# Patient Record
Sex: FEMALE | Race: WHITE | NOT HISPANIC OR LATINO | Employment: OTHER | URBAN - METROPOLITAN AREA
[De-identification: names, ages, dates, MRNs, and addresses within clinical notes are randomized per-mention and may not be internally consistent; named-entity substitution may affect disease eponyms.]

---

## 2020-10-13 ENCOUNTER — OFFICE VISIT (OUTPATIENT)
Dept: ENDOCRINOLOGY | Facility: CLINIC | Age: 60
End: 2020-10-13

## 2020-10-13 ENCOUNTER — LAB (OUTPATIENT)
Dept: LAB | Facility: HOSPITAL | Age: 60
End: 2020-10-13

## 2020-10-13 VITALS
HEART RATE: 61 BPM | SYSTOLIC BLOOD PRESSURE: 102 MMHG | WEIGHT: 125.8 LBS | OXYGEN SATURATION: 99 % | DIASTOLIC BLOOD PRESSURE: 62 MMHG

## 2020-10-13 DIAGNOSIS — R53.82 CHRONIC FATIGUE: ICD-10-CM

## 2020-10-13 DIAGNOSIS — E03.9 ACQUIRED HYPOTHYROIDISM: Primary | ICD-10-CM

## 2020-10-13 DIAGNOSIS — K90.9 INTESTINAL MALABSORPTION, UNSPECIFIED TYPE: ICD-10-CM

## 2020-10-13 LAB
BASOPHILS # BLD AUTO: 0.03 10*3/MM3 (ref 0–0.2)
BASOPHILS NFR BLD AUTO: 0.7 % (ref 0–1.5)
DEPRECATED RDW RBC AUTO: 40.4 FL (ref 37–54)
EOSINOPHIL # BLD AUTO: 0.09 10*3/MM3 (ref 0–0.4)
EOSINOPHIL NFR BLD AUTO: 2 % (ref 0.3–6.2)
ERYTHROCYTE [DISTWIDTH] IN BLOOD BY AUTOMATED COUNT: 11.8 % (ref 12.3–15.4)
HCT VFR BLD AUTO: 37.1 % (ref 34–46.6)
HGB BLD-MCNC: 12.7 G/DL (ref 12–15.9)
LYMPHOCYTES # BLD AUTO: 0.89 10*3/MM3 (ref 0.7–3.1)
LYMPHOCYTES NFR BLD AUTO: 20.1 % (ref 19.6–45.3)
MCH RBC QN AUTO: 32.6 PG (ref 26.6–33)
MCHC RBC AUTO-ENTMCNC: 34.2 G/DL (ref 31.5–35.7)
MCV RBC AUTO: 95.1 FL (ref 79–97)
MONOCYTES # BLD AUTO: 0.26 10*3/MM3 (ref 0.1–0.9)
MONOCYTES NFR BLD AUTO: 5.9 % (ref 5–12)
NEUTROPHILS NFR BLD AUTO: 3.16 10*3/MM3 (ref 1.7–7)
NEUTROPHILS NFR BLD AUTO: 71.3 % (ref 42.7–76)
PLATELET # BLD AUTO: 139 10*3/MM3 (ref 140–450)
PMV BLD AUTO: 11.7 FL (ref 6–12)
RBC # BLD AUTO: 3.9 10*6/MM3 (ref 3.77–5.28)
WBC # BLD AUTO: 4.43 10*3/MM3 (ref 3.4–10.8)

## 2020-10-13 PROCEDURE — 83516 IMMUNOASSAY NONANTIBODY: CPT | Performed by: PHYSICIAN ASSISTANT

## 2020-10-13 PROCEDURE — 99243 OFF/OP CNSLTJ NEW/EST LOW 30: CPT | Performed by: PHYSICIAN ASSISTANT

## 2020-10-13 PROCEDURE — 86376 MICROSOMAL ANTIBODY EACH: CPT | Performed by: PHYSICIAN ASSISTANT

## 2020-10-13 PROCEDURE — 82306 VITAMIN D 25 HYDROXY: CPT | Performed by: PHYSICIAN ASSISTANT

## 2020-10-13 PROCEDURE — 86255 FLUORESCENT ANTIBODY SCREEN: CPT | Performed by: PHYSICIAN ASSISTANT

## 2020-10-13 PROCEDURE — 84443 ASSAY THYROID STIM HORMONE: CPT | Performed by: PHYSICIAN ASSISTANT

## 2020-10-13 PROCEDURE — 85025 COMPLETE CBC W/AUTO DIFF WBC: CPT | Performed by: PHYSICIAN ASSISTANT

## 2020-10-13 PROCEDURE — 82784 ASSAY IGA/IGD/IGG/IGM EACH: CPT | Performed by: PHYSICIAN ASSISTANT

## 2020-10-13 PROCEDURE — 80053 COMPREHEN METABOLIC PANEL: CPT | Performed by: PHYSICIAN ASSISTANT

## 2020-10-13 PROCEDURE — 84439 ASSAY OF FREE THYROXINE: CPT | Performed by: PHYSICIAN ASSISTANT

## 2020-10-13 PROCEDURE — 82728 ASSAY OF FERRITIN: CPT | Performed by: PHYSICIAN ASSISTANT

## 2020-10-13 RX ORDER — LABETALOL 300 MG/1
TABLET, FILM COATED ORAL
COMMUNITY
End: 2022-08-31 | Stop reason: DRUGHIGH

## 2020-10-13 RX ORDER — ROSUVASTATIN CALCIUM 20 MG/1
TABLET, COATED ORAL
COMMUNITY
End: 2021-09-08

## 2020-10-13 RX ORDER — ASPIRIN 81 MG/1
TABLET ORAL
COMMUNITY
Start: 2019-01-16

## 2020-10-13 RX ORDER — ASPIRIN 81 MG/1
TABLET ORAL
COMMUNITY
End: 2020-10-13 | Stop reason: SDUPTHER

## 2020-10-13 RX ORDER — HERBAL DRUGS
CAPSULE ORAL
COMMUNITY
End: 2021-06-04

## 2020-10-13 RX ORDER — LEVOTHYROXINE SODIUM 0.2 MG/1
TABLET ORAL
COMMUNITY
End: 2020-10-19

## 2020-10-13 RX ORDER — LEVOTHYROXINE SODIUM 137 MCG
TABLET ORAL
COMMUNITY
Start: 2020-09-27 | End: 2020-10-19

## 2020-10-13 RX ORDER — FUROSEMIDE 40 MG/1
TABLET ORAL
COMMUNITY
Start: 2020-07-17 | End: 2022-05-10 | Stop reason: SDUPTHER

## 2020-10-13 RX ORDER — CLOPIDOGREL BISULFATE 75 MG/1
TABLET ORAL
COMMUNITY
Start: 2020-07-17 | End: 2022-05-10 | Stop reason: SDUPTHER

## 2020-10-13 RX ORDER — POTASSIUM CHLORIDE 1.5 G/1.77G
20 POWDER, FOR SOLUTION ORAL AS NEEDED
COMMUNITY

## 2020-10-13 RX ORDER — LISINOPRIL 10 MG/1
TABLET ORAL
COMMUNITY
End: 2022-08-31

## 2020-10-13 RX ORDER — ESTRADIOL 1 MG/1
1 TABLET ORAL DAILY
COMMUNITY
Start: 2020-09-27 | End: 2021-10-13 | Stop reason: SDUPTHER

## 2020-10-13 NOTE — PROGRESS NOTES
Chief Complaint:   Radha White is a 60 y.o. female who is being seen today for  Hypothyroidism . Referred by Lucian Ridley MD     HPI     59 yo female with CAD s/p remote stents followed by Dr. Demetri Yang, hypertension, hyperlipidemia, hx of nephrectomy 1965, complete hysterectomy 1996.    She c/o severe fatigue and hair loss over the last year. Has gained 15 pounds in the last year after weight was stable for a very long time. Diet has not changed. Activity has not changed. Not sleeping well, which is not normal for her.   She c/o hot flashes. On estradiol.   She feels very bloated after eating for about a year, but not with any specific food. She does not eat much dairy. She denies diarrhea, usually has constipation. Denies blood in stool.   She has chronic joint pain.   No skin changes. Has a chronic dry patch on right elbow otherwise no dry skin.   Had liver failure in 2007- work up was normal and it was attributed to naproxen.   Diagnosed: 1987 on labs after reporting hair thinning   Medication: synthroid brand total 337 (200 plus 137) mcg daily - she takes in the morning on empty stomach with water and waits several hours to eat. Not on any iron, calcium, biotin supplements. Not missing doses.    Previous medication: levothyroxine (worked for a long time, but eventually labs stopped responding to higher doses of generic)  Hx of radiation to head/neck: no  Family hx of thyroid cancer: no  Daughter has Hashimoto's and was told to avoid gluten. She has to been changed to Tirosint due to absorption issues.     The following portions of the patient's history were reviewed and updated as appropriate: allergies, current medications, past family history, past medical history, past social history, past surgical history and problem list.    Review of Systems  Review of Systems   Constitutional: Positive for fatigue and unexpected weight change (weight gain).   Gastrointestinal: Positive for abdominal  distention (after eating) and constipation.   Endocrine:        Hot flashes   Musculoskeletal: Positive for arthralgias.   Psychiatric/Behavioral: Positive for sleep disturbance.   All other systems reviewed and are negative.       Current medications:  Current Outpatient Medications   Medication Sig Dispense Refill   • aspirin (aspirin) 81 MG EC tablet Take 1 capsule twice a day by oral route for 30 days.     • clopidogrel (PLAVIX) 75 MG tablet clopidogrel 75 mg tablet     • estradiol (ESTRACE) 1 MG tablet Take 1 mg by mouth Daily.     • furosemide (LASIX) 40 MG tablet furosemide 40 mg tablet     • labetalol (NORMODYNE) 300 MG tablet labetalol 300 mg tablet   Take 1 tablet every day by oral route.     • levothyroxine (Synthroid) 200 MCG tablet Synthroid 200 mcg tablet     • lisinopril (PRINIVIL,ZESTRIL) 10 MG tablet lisinopril 10 mg tablet     • Misc Natural Products (Detox) capsule Take  by mouth.     • Omeprazole 20 MG Tablet Delayed Release Dispersible omeprazole 20 mg capsule,delayed release     • potassium chloride (Klor-Con) 20 MEQ packet Take 20 mEq by mouth As Needed.     • Probiotic Product (Pro-biotic Blend) capsule Take  by mouth.     • rosuvastatin (CRESTOR) 20 MG tablet rosuvastatin 20 mg tablet   Take 1 tablet every day by oral route.     • Synthroid 137 MCG tablet TAKE 1 TABLET BY MOUTH ONCE DAILY ALONG WITH 200 MCG TABLET FOR TOTAL DAILY DOSE  MCG     • Ubiquinol 100 MG capsule coenzyme Q10 (ubiquinol) 100 mg capsule   Take 2 capsules by oral route for 30 days.       No current facility-administered medications for this visit.        Physical Exam   Vitals:    10/13/20 1009   BP: 102/62   Pulse: 61   SpO2: 99%   There is no height or weight on file to calculate BMI.  Physical Exam  Constitutional:       General: She is not in acute distress.     Appearance: She is well-developed. She is not diaphoretic.   HENT:      Head: Normocephalic.      Right Ear: External ear normal.      Left Ear:  External ear normal.      Mouth/Throat:      Pharynx: No oropharyngeal exudate.   Eyes:      General: Lids are normal.         Right eye: No discharge.         Left eye: No discharge.      Conjunctiva/sclera: Conjunctivae normal.      Pupils:      Right eye: Pupil is reactive.      Left eye: Pupil is reactive.   Neck:      Thyroid: No thyroid mass or thyromegaly.      Vascular: No JVD.      Trachea: No tracheal deviation.   Cardiovascular:      Rate and Rhythm: Normal rate and regular rhythm.      Heart sounds: Normal heart sounds. No murmur.   Pulmonary:      Effort: Pulmonary effort is normal. No respiratory distress.      Breath sounds: Normal breath sounds. No wheezing.   Abdominal:      General: Bowel sounds are normal.      Palpations: Abdomen is soft.      Tenderness: There is no abdominal tenderness.   Musculoskeletal:         General: No tenderness.   Lymphadenopathy:      Cervical: No cervical adenopathy.   Skin:     General: Skin is warm and dry.      Findings: No erythema or rash.   Neurological:      Mental Status: She is alert and oriented to person, place, and time.   Psychiatric:         Speech: Speech normal.         Behavior: Behavior normal.         Thought Content: Thought content normal.         Labs and Imaging   No results found for: TSH, B8HQYCP, L6YBJBS, THYROIDAB     Labs reviewed from 7/17/2020- TSH 6.48, Total T3 73.3    Assessment / Plan     Diagnoses and all orders for this visit:    1. Acquired hypothyroidism (Primary)  -     TSH  -     T4, Free  -     Thyroid Peroxidase Antibody    2. Intestinal malabsorption, unspecified type  -     Celiac Comprehensive Panel  -     Comprehensive Metabolic Panel  -     CBC & Differential    3. Chronic fatigue  -     Vitamin D 25 Hydroxy  -     Ferritin  -     Comprehensive Metabolic Panel  -     CBC & Differential        Problem List Items Addressed This Visit     None      Visit Diagnoses     Acquired hypothyroidism    -  Primary    Relevant  Medications    labetalol (NORMODYNE) 300 MG tablet    levothyroxine (Synthroid) 200 MCG tablet    Synthroid 137 MCG tablet    Other Relevant Orders    TSH    T4, Free    Thyroid Peroxidase Antibody    Intestinal malabsorption, unspecified type        Relevant Orders    Celiac Comprehensive Panel    Comprehensive Metabolic Panel    CBC & Differential    Chronic fatigue        Relevant Orders    Vitamin D 25 Hydroxy    Ferritin    Comprehensive Metabolic Panel    CBC & Differential        Diagnosis was discussed and reviewed with the patient including the advantages of drug therapy.        1. Patient appears clinically hypothyroid. TSH >6 on very large doses of Synthroid brand 337 mcg total per day. Patient is taking the medication correctly and is not missing doses. Repeat TFTs. Suspect some absorption issues. Plan to change to Tirosint after labs. Check celiac panel.       We have discussed in details the nature of the thyroid disease, thyroid hormone action, optimal TSH goals of 0.5-3.5, method of administration of levothyroxine/synthroid/tirosint and medication interactions.  I recommended taking the medication on an empty stomach in the morning or at bedtime, at least 30 minutes prior to intake of food or hot drinks and 4 hours apart from calcium or iron supplements. If a dose is missed patient can take two the following day.   2. Check vit d, ferritin with her fatigue  3. Patient will return to our office in 2 months.   4. The risks and benefits of my recommendations, as well as other treatment options were discussed with the patient today. Questions were answered.       30 min of 45 face-to-face visit time spent for coordination of care and counselling regarding identified problems as outlined in the objective, assessment and discussion portions of the documentation.    Urbano Helton PA-C

## 2020-10-14 LAB
25(OH)D3 SERPL-MCNC: 44.6 NG/ML (ref 30–100)
ALBUMIN SERPL-MCNC: 4.4 G/DL (ref 3.5–5.2)
ALBUMIN/GLOB SERPL: 1.3 G/DL
ALP SERPL-CCNC: 210 U/L (ref 39–117)
ALT SERPL W P-5'-P-CCNC: 74 U/L (ref 1–33)
ANION GAP SERPL CALCULATED.3IONS-SCNC: 11 MMOL/L (ref 5–15)
AST SERPL-CCNC: 114 U/L (ref 1–32)
BILIRUB SERPL-MCNC: 0.7 MG/DL (ref 0–1.2)
BUN SERPL-MCNC: 16 MG/DL (ref 6–20)
BUN/CREAT SERPL: 20.5 (ref 7–25)
CALCIUM SPEC-SCNC: 9.7 MG/DL (ref 8.6–10.5)
CHLORIDE SERPL-SCNC: 99 MMOL/L (ref 98–107)
CO2 SERPL-SCNC: 25 MMOL/L (ref 22–29)
CREAT SERPL-MCNC: 0.78 MG/DL (ref 0.57–1)
FERRITIN SERPL-MCNC: 284 NG/ML (ref 13–150)
GFR SERPL CREATININE-BSD FRML MDRD: 76 ML/MIN/1.73
GLOBULIN UR ELPH-MCNC: 3.5 GM/DL
GLUCOSE SERPL-MCNC: 113 MG/DL (ref 65–99)
POTASSIUM SERPL-SCNC: 4.3 MMOL/L (ref 3.5–5.2)
PROT SERPL-MCNC: 7.9 G/DL (ref 6–8.5)
SODIUM SERPL-SCNC: 135 MMOL/L (ref 136–145)
T4 FREE SERPL-MCNC: 1.91 NG/DL (ref 0.93–1.7)
TSH SERPL DL<=0.05 MIU/L-ACNC: 1.06 UIU/ML (ref 0.27–4.2)

## 2020-10-15 LAB
ENDOMYSIUM IGA SER QL: NEGATIVE
GLIADIN PEPTIDE IGA SER-ACNC: 3 UNITS (ref 0–19)
GLIADIN PEPTIDE IGG SER-ACNC: 3 UNITS (ref 0–19)
IGA SERPL-MCNC: 90 MG/DL (ref 87–352)
THYROPEROXIDASE AB SERPL-ACNC: 19 IU/ML (ref 0–34)
TTG IGA SER-ACNC: <2 U/ML (ref 0–3)
TTG IGG SER-ACNC: <2 U/ML (ref 0–5)

## 2020-10-19 ENCOUNTER — TELEPHONE (OUTPATIENT)
Dept: ENDOCRINOLOGY | Facility: CLINIC | Age: 60
End: 2020-10-19

## 2020-10-19 DIAGNOSIS — E03.9 ACQUIRED HYPOTHYROIDISM: Primary | ICD-10-CM

## 2020-10-19 RX ORDER — LEVOTHYROXINE SODIUM 137 UG/1
137 CAPSULE ORAL DAILY
Qty: 90 CAPSULE | Refills: 1 | Status: SHIPPED | OUTPATIENT
Start: 2020-10-19 | End: 2021-01-07 | Stop reason: DRUGHIGH

## 2020-10-19 RX ORDER — LEVOTHYROXINE SODIUM 200 UG/1
200 CAPSULE ORAL DAILY
Qty: 90 CAPSULE | Refills: 1 | Status: SHIPPED | OUTPATIENT
Start: 2020-10-19 | End: 2021-04-08 | Stop reason: SDUPTHER

## 2020-10-19 NOTE — TELEPHONE ENCOUNTER
PT was hoping PCP could discuss her blood work results that she had done 10/13/2020. She ask that someone reach out to her

## 2020-10-19 NOTE — TELEPHONE ENCOUNTER
Called and spoke with patient informed of lab results. Will document on labs. Other than that she does like the tirinosint that was prescribed she does feel better overall and would like prescription sent to the pharmacy.

## 2020-10-19 NOTE — TELEPHONE ENCOUNTER
Called and spoke to patient, informed of message. She verbalized understanding and had no further questions.

## 2021-01-05 ENCOUNTER — TELEPHONE (OUTPATIENT)
Dept: ENDOCRINOLOGY | Facility: CLINIC | Age: 61
End: 2021-01-05

## 2021-01-05 DIAGNOSIS — E03.9 ACQUIRED HYPOTHYROIDISM: Primary | ICD-10-CM

## 2021-01-05 NOTE — TELEPHONE ENCOUNTER
PT was informed that labs orders were placed and that liver function and thyroid would continue to be followed.

## 2021-01-05 NOTE — TELEPHONE ENCOUNTER
Patient was started on new medication at her last appointment and she wanted to come in and have labs drawn before her February appointment. Please put orders in her chart. Also, she wanted to let Urbano know that she has seen a liver specialist since her last appt with Urbano and was told her liver issues could be happening due to her thyroid issues.

## 2021-01-06 ENCOUNTER — LAB (OUTPATIENT)
Dept: ENDOCRINOLOGY | Facility: CLINIC | Age: 61
End: 2021-01-06

## 2021-01-06 ENCOUNTER — LAB (OUTPATIENT)
Dept: LAB | Facility: HOSPITAL | Age: 61
End: 2021-01-06

## 2021-01-06 DIAGNOSIS — E03.9 ACQUIRED HYPOTHYROIDISM: ICD-10-CM

## 2021-01-06 PROCEDURE — 84439 ASSAY OF FREE THYROXINE: CPT | Performed by: PHYSICIAN ASSISTANT

## 2021-01-06 PROCEDURE — 84443 ASSAY THYROID STIM HORMONE: CPT | Performed by: PHYSICIAN ASSISTANT

## 2021-01-07 ENCOUNTER — TELEPHONE (OUTPATIENT)
Dept: ENDOCRINOLOGY | Facility: CLINIC | Age: 61
End: 2021-01-07

## 2021-01-07 DIAGNOSIS — E03.9 ACQUIRED HYPOTHYROIDISM: Primary | ICD-10-CM

## 2021-01-07 LAB
T4 FREE SERPL-MCNC: 2.83 NG/DL (ref 0.93–1.7)
TSH SERPL DL<=0.05 MIU/L-ACNC: <0.005 UIU/ML (ref 0.27–4.2)

## 2021-01-07 RX ORDER — LEVOTHYROXINE SODIUM 75 UG/1
75 CAPSULE ORAL DAILY
Qty: 90 CAPSULE | Refills: 0 | Status: SHIPPED | OUTPATIENT
Start: 2021-01-07 | End: 2021-04-08 | Stop reason: DRUGHIGH

## 2021-01-07 NOTE — TELEPHONE ENCOUNTER
PT CALLED WANTING TO SPEAK W/ SOMEONE IN REGARDS TO HER MOST RECENT LABS. SHE WAS UPSET THAT WE HAD SENT IN A CHANGE IN HER MEDICATION BEFORE WE SPOKE W/ HER. PLEASE REACH OUT TO HER AT EARLIEST CONVENIENCE. THANK YOU

## 2021-02-01 ENCOUNTER — OFFICE VISIT (OUTPATIENT)
Dept: ENDOCRINOLOGY | Facility: CLINIC | Age: 61
End: 2021-02-01

## 2021-02-01 DIAGNOSIS — E03.9 ACQUIRED HYPOTHYROIDISM: Primary | Chronic | ICD-10-CM

## 2021-02-01 PROCEDURE — 99441 PR PHYS/QHP TELEPHONE EVALUATION 5-10 MIN: CPT | Performed by: PHYSICIAN ASSISTANT

## 2021-02-01 RX ORDER — MULTIPLE VITAMINS W/ MINERALS TAB 9MG-400MCG
1 TAB ORAL DAILY
COMMUNITY

## 2021-02-01 NOTE — PROGRESS NOTES
"Phone visit conducted to comply with patient safety concerns in accordance with CDC recommendations for the COVID-19 pandemic.     Chief Complaint:   Radha White is a 60 y.o. female who is being seen today for  Hypothyroidism .    HPI     61 yo female with CAD s/p remote stents followed by Dr. Demetri Yang, hypertension, hyperlipidemia, hx of nephrectomy 1965, complete hysterectomy 1996, following up on hypothyroidism today.   Last visit we change Synthroid 337 to Tirostint 275 due to concern for malabsorption with such a high dose. Repeat labs 1/5 showed undetectable TSH with high FT4. We decreased Tirosint to 275 mcg daily. Today pt reports to be feeling much better on this dose. She is sleeping better, has more energy, and has lost some weight. Does feel like she \"retains water\" more and has to take her lasix about once a week whereas before she took it about once a month.     Had liver failure in 2007- work up was normal and it was attributed to naproxen. Since last visit she has seen a liver specialist at  who was concerned liver fibrosis may be due to such high doses of BP meds and synthroid.     Diagnosed: 1987 on labs after reporting hair thinning   Medication: tirostin 275 mcg daily - she takes in the morning on empty stomach with water and waits several hours to eat. Not on any iron, calcium, biotin supplements. Not missing doses.    Previous medication: levothyroxine (worked for a long time, but eventually labs stopped responding to higher doses of generic), synthroid brand  Hx of radiation to head/neck: no  Family hx of thyroid cancer: no  Daughter has Hashimoto's and was told to avoid gluten. She has to been changed to Tirosint due to absorption issues.     The following portions of the patient's history were reviewed and updated by me as appropriate: allergies, current medications, past family history, past social history, past surgical history and problem list.      Review of Systems  Review of " Systems   Constitutional: Positive for fatigue. Diaphoresis: improving.   Cardiovascular: Positive for leg swelling.   Gastrointestinal: Abdominal distention: after eating.   Endocrine:        Hot flashes   Psychiatric/Behavioral: Positive for sleep disturbance (improving).   All other systems reviewed and are negative.       Current medications:  Current Outpatient Medications   Medication Sig Dispense Refill   • aspirin (aspirin) 81 MG EC tablet Take 1 capsule twice a day by oral route for 30 days.     • clopidogrel (PLAVIX) 75 MG tablet clopidogrel 75 mg tablet     • estradiol (ESTRACE) 1 MG tablet Take 1 mg by mouth Daily.     • furosemide (LASIX) 40 MG tablet furosemide 40 mg tablet     • labetalol (NORMODYNE) 300 MG tablet labetalol 300 mg tablet   Take 1 tablet every day by oral route.     • lisinopril (PRINIVIL,ZESTRIL) 10 MG tablet lisinopril 10 mg tablet     • Misc Natural Products (Detox) capsule Take  by mouth.     • multivitamin with minerals tablet tablet Take 1 tablet by mouth Daily.     • Omeprazole 20 MG Tablet Delayed Release Dispersible omeprazole 20 mg capsule,delayed release     • potassium chloride (Klor-Con) 20 MEQ packet Take 20 mEq by mouth As Needed.     • Probiotic Product (Pro-biotic Blend) capsule Take  by mouth.     • rosuvastatin (CRESTOR) 20 MG tablet rosuvastatin 20 mg tablet   Take 1 tablet every day by oral route.     • Tirosint 200 MCG capsule Take 200 mcg by mouth Daily. 90 capsule 1   • Tirosint 75 MCG capsule Take 1 capsule by mouth Daily. 90 capsule 0   • Ubiquinol 100 MG capsule coenzyme Q10 (ubiquinol) 100 mg capsule   Take 2 capsules by oral route for 30 days.       No current facility-administered medications for this visit.        Physical Exam   There were no vitals filed for this visit.There is no height or weight on file to calculate BMI.  Physical Exam  Constitutional:       General: She is not in acute distress.  Neurological:      Mental Status: She is alert and  oriented to person, place, and time.   Psychiatric:         Mood and Affect: Mood normal.         Thought Content: Thought content normal.         Judgment: Judgment normal.     Limited exam due video/phone visit      Labs and Imaging   Lab Results   Component Value Date    TSH <0.005 (L) 01/06/2021    THYROIDAB 19 10/13/2020    1.5.2020 - free t4 2.83      Assessment / Plan     Diagnoses and all orders for this visit:    1. Acquired hypothyroidism (Primary)  -     TSH; Future  -     T4, Free; Future        Problem List Items Addressed This Visit        Other    Acquired hypothyroidism - Primary (Chronic)    Relevant Orders    TSH    T4, Free        Diagnosis was discussed and reviewed with the patient including the advantages of drug therapy.        1. Patient appears clinically euthyroid. Overall much better on Tirosint and lower dose. Dose was decreased about 4 weeks ago. Will wait another 4 weeks before patient repeats labs. Suspect dose will need to be decreased further.  2. Patient will return to our office in 3 months.   3. The risks and benefits of my recommendations, as well as other treatment options were discussed with the patient today. Questions were answered.    Total time of discussion was 10 minutes.      Urbano Helton PA-C

## 2021-03-03 ENCOUNTER — LAB (OUTPATIENT)
Dept: LAB | Facility: HOSPITAL | Age: 61
End: 2021-03-03

## 2021-03-03 ENCOUNTER — TELEPHONE (OUTPATIENT)
Dept: ENDOCRINOLOGY | Facility: CLINIC | Age: 61
End: 2021-03-03

## 2021-03-03 ENCOUNTER — LAB (OUTPATIENT)
Dept: ENDOCRINOLOGY | Facility: CLINIC | Age: 61
End: 2021-03-03

## 2021-03-03 DIAGNOSIS — E03.9 ACQUIRED HYPOTHYROIDISM: Chronic | ICD-10-CM

## 2021-03-03 LAB
T4 FREE SERPL-MCNC: 2.65 NG/DL (ref 0.93–1.7)
TSH SERPL DL<=0.05 MIU/L-ACNC: 0.01 UIU/ML (ref 0.27–4.2)

## 2021-03-03 PROCEDURE — 84439 ASSAY OF FREE THYROXINE: CPT

## 2021-03-03 PROCEDURE — 84443 ASSAY THYROID STIM HORMONE: CPT

## 2021-03-03 NOTE — TELEPHONE ENCOUNTER
Pt came in for labs today and she wanted to talk to you before the new rx is called in     pts number [phone]

## 2021-03-03 NOTE — TELEPHONE ENCOUNTER
PT wanted to make you are aware, before you made any changes based on the lab work she had done today, that she has gained 8 lbs in 4 weeks since you changed it last time. She states that she is a healthy eater and works out hard everyday therefor there is no reason for her to gain weight other than this thyroid issue.

## 2021-03-09 ENCOUNTER — TELEPHONE (OUTPATIENT)
Dept: ENDOCRINOLOGY | Facility: CLINIC | Age: 61
End: 2021-03-09

## 2021-03-29 ENCOUNTER — TELEPHONE (OUTPATIENT)
Dept: ENDOCRINOLOGY | Facility: CLINIC | Age: 61
End: 2021-03-29

## 2021-03-29 NOTE — TELEPHONE ENCOUNTER
PATIENT IS REQUESTING A RETURN CALL FROM [facility] TO DISCUSS RECENT METABOLISM ISSUES. SHE REQUESTED TO BE SEEN SOONER THAN HER 5/10 APPT BUT NO OPENINGS AVAILABLE AT THIS TIME.    CALL BACK [phone]

## 2021-04-07 ENCOUNTER — LAB (OUTPATIENT)
Dept: LAB | Facility: HOSPITAL | Age: 61
End: 2021-04-07

## 2021-04-07 ENCOUNTER — OFFICE VISIT (OUTPATIENT)
Dept: ENDOCRINOLOGY | Facility: CLINIC | Age: 61
End: 2021-04-07

## 2021-04-07 VITALS
WEIGHT: 126.2 LBS | DIASTOLIC BLOOD PRESSURE: 90 MMHG | HEART RATE: 62 BPM | SYSTOLIC BLOOD PRESSURE: 142 MMHG | OXYGEN SATURATION: 99 % | HEIGHT: 63 IN | BODY MASS INDEX: 22.36 KG/M2 | TEMPERATURE: 98.4 F

## 2021-04-07 DIAGNOSIS — E03.9 ACQUIRED HYPOTHYROIDISM: Primary | Chronic | ICD-10-CM

## 2021-04-07 DIAGNOSIS — R63.5 WEIGHT GAIN: ICD-10-CM

## 2021-04-07 LAB
T4 FREE SERPL-MCNC: 1.9 NG/DL (ref 0.93–1.7)
TSH SERPL DL<=0.05 MIU/L-ACNC: 0.01 UIU/ML (ref 0.27–4.2)

## 2021-04-07 PROCEDURE — 99214 OFFICE O/P EST MOD 30 MIN: CPT | Performed by: PHYSICIAN ASSISTANT

## 2021-04-07 PROCEDURE — 84439 ASSAY OF FREE THYROXINE: CPT | Performed by: PHYSICIAN ASSISTANT

## 2021-04-07 PROCEDURE — 84443 ASSAY THYROID STIM HORMONE: CPT | Performed by: PHYSICIAN ASSISTANT

## 2021-04-07 RX ORDER — DEXAMETHASONE 1 MG
TABLET ORAL
Qty: 1 TABLET | Refills: 0 | Status: SHIPPED | OUTPATIENT
Start: 2021-04-07 | End: 2021-09-28

## 2021-04-07 NOTE — PROGRESS NOTES
Chief Complaint:   Radha White is a 60 y.o. female who is being seen today for  Hypothyroidism .    HPI     59 yo female with CAD s/p remote stents followed by Dr. Demetri Yang, hypertension, hyperlipidemia, hx of nephrectomy 1965, complete hysterectomy 1996, following up on hypothyroidism today.   10/2020- we changed Synthroid 337 to Tirostint 275 due to concern for malabsorption with such a high dose.    1/2020- repeat labs showed undetectable TSH with high FT4. We decreased Tirosint to 275 mcg daily.   3/3/2020 - TSH still low at 0.008 but no longer undetectable. Pt c/o new weight gain at that point so we planned to repeat labs in another month instead of decreasing dose just yet.   Today pt c/o weight gain though her weight in the same per out scale compared to weight in October. She does clarify and say weight gain is chronic over several years. She exercises regularly and eats well and is frustrated with inability to lose weight.   Also think sweating at night has increased over the last few months.     Had liver failure in 2007- work up was normal and it was attributed to naproxen. Since last visit she has seen a liver specialist at  who was concerned liver fibrosis may be due to such high doses of BP meds and synthroid.     Diagnosed: 1987 on labs after reporting hair thinning   Medication: tirostin 275 mcg daily - she takes in the morning on empty stomach with water and waits several hours to eat. Not on any iron, calcium, biotin supplements. Not missing doses.    Previous medication: levothyroxine (worked for a long time, but eventually labs stopped responding to higher doses of generic), synthroid brand  Hx of radiation to head/neck: no  Family hx of thyroid cancer: no  Daughter has Hashimoto's and was told to avoid gluten. She has to been changed to Tirosint due to absorption issues.     The following portions of the patient's history were reviewed and updated by me as appropriate: allergies,  current medications, past family history, past social history, past surgical history and problem list.        Review of Systems  Review of Systems   Constitutional: Positive for fatigue.        Unable to lose weight   Gastrointestinal: Abdominal distention: after eating.   Endocrine:        Hot flashes   Psychiatric/Behavioral: Positive for sleep disturbance (improving).   All other systems reviewed and are negative.       Current medications:  Current Outpatient Medications   Medication Sig Dispense Refill   • aspirin (aspirin) 81 MG EC tablet Take 1 capsule twice a day by oral route for 30 days.     • clopidogrel (PLAVIX) 75 MG tablet clopidogrel 75 mg tablet     • estradiol (ESTRACE) 1 MG tablet Take 1 mg by mouth Daily.     • furosemide (LASIX) 40 MG tablet furosemide 40 mg tablet     • labetalol (NORMODYNE) 300 MG tablet labetalol 300 mg tablet   Take 1 tablet every day by oral route.     • lisinopril (PRINIVIL,ZESTRIL) 10 MG tablet lisinopril 10 mg tablet     • Misc Natural Products (Detox) capsule Take  by mouth.     • multivitamin with minerals tablet tablet Take 1 tablet by mouth Daily.     • Omeprazole 20 MG Tablet Delayed Release Dispersible omeprazole 20 mg capsule,delayed release     • potassium chloride (Klor-Con) 20 MEQ packet Take 20 mEq by mouth As Needed.     • Probiotic Product (Pro-biotic Blend) capsule Take  by mouth.     • rosuvastatin (CRESTOR) 20 MG tablet rosuvastatin 20 mg tablet   Take 1 tablet every day by oral route.     • Tirosint 200 MCG capsule Take 200 mcg by mouth Daily. 90 capsule 1   • Tirosint 75 MCG capsule Take 1 capsule by mouth Daily. 90 capsule 0   • Ubiquinol 100 MG capsule coenzyme Q10 (ubiquinol) 100 mg capsule   Take 2 capsules by oral route for 30 days.     • dexamethasone (DECADRON) 1 MG tablet Take 1mg x 1 at 11 pm the night before cortisol checked. Cortisol should be checked at 8AM the following day 1 tablet 0     No current facility-administered medications for this  visit.       Physical Exam   Vitals:    04/07/21 1157   BP: 142/90   Pulse: 62   Temp: 98.4 °F (36.9 °C)   SpO2: 99%   Body mass index is 22.36 kg/m².  Physical Exam  Constitutional:       General: She is not in acute distress.  Neurological:      Mental Status: She is alert and oriented to person, place, and time.   Psychiatric:         Mood and Affect: Mood normal.         Thought Content: Thought content normal.         Judgment: Judgment normal.           Labs and Imaging   Lab Results   Component Value Date    TSH 0.008 (L) 03/03/2021    THYROIDAB 19 10/13/2020    1.5.2020 - free t4 2.83      Assessment / Plan     Diagnoses and all orders for this visit:    1. Acquired hypothyroidism (Primary)  -     TSH  -     T4, Free    2. Weight gain  -     Cortisol Dexamethasone Reflex; Future  -     dexamethasone (DECADRON) 1 MG tablet; Take 1mg x 1 at 11 pm the night before cortisol checked. Cortisol should be checked at 8AM the following day  Dispense: 1 tablet; Refill: 0        Problem List Items Addressed This Visit        Other    Acquired hypothyroidism - Primary (Chronic)    Relevant Medications    dexamethasone (DECADRON) 1 MG tablet    Other Relevant Orders    TSH    T4, Free      Other Visit Diagnoses     Weight gain        Relevant Medications    dexamethasone (DECADRON) 1 MG tablet    Other Relevant Orders    Cortisol Dexamethasone Reflex        Diagnosis was discussed and reviewed with the patient including the advantages of drug therapy.        1. Patient appears clinically hyperthyroid. Repeat labs with plan to decrease dose. Discussed weight gain unlikely related to thyroid- she has not lost any weight when TSH was around 1.0 or when it was undetectable. Low suspicion for Cushing's but will screen.  2. Patient will return to our office in 2 months.   3. The risks and benefits of my recommendations, as well as other treatment options were discussed with the patient today. Questions were answered.    Urbano  MONA Helton

## 2021-04-08 DIAGNOSIS — E03.9 ACQUIRED HYPOTHYROIDISM: ICD-10-CM

## 2021-04-08 RX ORDER — LEVOTHYROXINE SODIUM 200 UG/1
200 CAPSULE ORAL DAILY
Qty: 90 CAPSULE | Refills: 1 | Status: SHIPPED | OUTPATIENT
Start: 2021-04-08 | End: 2021-06-04 | Stop reason: DRUGHIGH

## 2021-04-08 RX ORDER — LEVOTHYROXINE SODIUM 25 UG/1
25 CAPSULE ORAL DAILY
Qty: 90 CAPSULE | Refills: 1 | Status: SHIPPED | OUTPATIENT
Start: 2021-04-08 | End: 2021-06-04 | Stop reason: DRUGHIGH

## 2021-06-04 ENCOUNTER — OFFICE VISIT (OUTPATIENT)
Dept: ENDOCRINOLOGY | Facility: CLINIC | Age: 61
End: 2021-06-04

## 2021-06-04 ENCOUNTER — LAB (OUTPATIENT)
Dept: LAB | Facility: HOSPITAL | Age: 61
End: 2021-06-04

## 2021-06-04 VITALS
DIASTOLIC BLOOD PRESSURE: 80 MMHG | WEIGHT: 126.2 LBS | SYSTOLIC BLOOD PRESSURE: 118 MMHG | BODY MASS INDEX: 22.36 KG/M2 | HEART RATE: 60 BPM | OXYGEN SATURATION: 98 %

## 2021-06-04 DIAGNOSIS — R63.8 UNABLE TO LOSE WEIGHT: ICD-10-CM

## 2021-06-04 DIAGNOSIS — E03.9 ACQUIRED HYPOTHYROIDISM: Primary | Chronic | ICD-10-CM

## 2021-06-04 LAB
T4 FREE SERPL-MCNC: 1.67 NG/DL (ref 0.93–1.7)
TSH SERPL DL<=0.05 MIU/L-ACNC: 0.02 UIU/ML (ref 0.27–4.2)

## 2021-06-04 PROCEDURE — 99214 OFFICE O/P EST MOD 30 MIN: CPT | Performed by: PHYSICIAN ASSISTANT

## 2021-06-04 PROCEDURE — 84443 ASSAY THYROID STIM HORMONE: CPT | Performed by: PHYSICIAN ASSISTANT

## 2021-06-04 PROCEDURE — 84439 ASSAY OF FREE THYROXINE: CPT | Performed by: PHYSICIAN ASSISTANT

## 2021-06-04 RX ORDER — LEVOTHYROXINE SODIUM 175 UG/1
75 CAPSULE ORAL DAILY
Qty: 90 CAPSULE | Refills: 1 | Status: SHIPPED | OUTPATIENT
Start: 2021-06-04 | End: 2021-12-14

## 2021-06-04 NOTE — PROGRESS NOTES
Chief Complaint:   Radha White is a 60 y.o. female who is being seen today for  Hypothyroidism .    HPI     61 yo female with CAD s/p remote stents followed by Dr. Demetri Yang, hypertension, hyperlipidemia, hx of nephrectomy 1965, complete hysterectomy 1996, following up on hypothyroidism today.   10/2020- we changed Synthroid 337 to Tirosint 275 due to concern for malabsorption with such a high dose.    1/2020- repeat labs showed undetectable TSH with high FT4. We decreased Tirosint to 275 mcg daily.   3/3/2021 - TSH still low at 0.008 but no longer undetectable.  4/7/2021 - TSH still low at 0.012 but improving, Tirosint was decreased to 225 mcg    Pt continue to c/o inability to lose weight. Per chart review her weight has been table around 125 pounds since 10/2020. She still has night sweats that are present and unchanged.   Did not have a chance to complete dex suppression test.  She otherwise feels well. No new complaints.      Had liver failure in 2007- work up was normal and it was attributed to naproxen. Since last visit she has seen a liver specialist at  who was concerned liver fibrosis may be due to such high doses of BP meds and synthroid. She has a f/u in august 2021.     Diagnosed: 1987 on labs after reporting hair thinning   Medication: tirostin 275 mcg daily - she takes in the morning on empty stomach with water and waits several hours to eat. Not on any iron, calcium, biotin supplements. Not missing doses.    Previous medication: levothyroxine (worked for a long time, but eventually labs stopped responding to higher doses of generic), synthroid brand  Hx of radiation to head/neck: no  Family hx of thyroid cancer: no  Daughter has Hashimoto's and was told to avoid gluten. She has to been changed to Tirosint due to absorption issues.   Hasn't had dex suppression     The following portions of the patient's history were reviewed and updated by me as appropriate: allergies, current medications,  past family history, past social history, past surgical history and problem list.      Review of Systems  Review of Systems   Constitutional:        Unable to lose weight   Endocrine:        Hot flashes   All other systems reviewed and are negative.       Current medications:  Current Outpatient Medications   Medication Sig Dispense Refill   • aspirin (aspirin) 81 MG EC tablet Take 1 capsule twice a day by oral route for 30 days.     • clopidogrel (PLAVIX) 75 MG tablet clopidogrel 75 mg tablet     • estradiol (ESTRACE) 1 MG tablet Take 1 mg by mouth Daily.     • furosemide (LASIX) 40 MG tablet furosemide 40 mg tablet     • labetalol (NORMODYNE) 300 MG tablet labetalol 300 mg tablet   Take 1 tablet every day by oral route.     • lisinopril (PRINIVIL,ZESTRIL) 10 MG tablet lisinopril 10 mg tablet     • multivitamin with minerals tablet tablet Take 1 tablet by mouth Daily.     • Omeprazole 20 MG Tablet Delayed Release Dispersible omeprazole 20 mg capsule,delayed release     • potassium chloride (Klor-Con) 20 MEQ packet Take 20 mEq by mouth As Needed.     • Probiotic Product (Pro-biotic Blend) capsule Take  by mouth.     • rosuvastatin (CRESTOR) 20 MG tablet rosuvastatin 20 mg tablet   Take 1 tablet every day by oral route.     • Tirosint 200 MCG capsule Take 200 mcg by mouth Daily. 90 capsule 1   • Tirosint 25 MCG capsule Take 1 capsule by mouth Daily. 90 capsule 1   • Ubiquinol 100 MG capsule coenzyme Q10 (ubiquinol) 100 mg capsule   Take 2 capsules by oral route for 30 days.     • dexamethasone (DECADRON) 1 MG tablet Take 1mg x 1 at 11 pm the night before cortisol checked. Cortisol should be checked at 8AM the following day 1 tablet 0     No current facility-administered medications for this visit.       Physical Exam   Vitals:    06/04/21 0817   BP: 118/80   Pulse: 60   SpO2: 98%   Body mass index is 22.36 kg/m².  Physical Exam  Constitutional:       General: She is not in acute distress.     Appearance: She is  well-developed. She is not diaphoretic.   HENT:      Head: Normocephalic.      Right Ear: External ear normal.      Left Ear: External ear normal.      Mouth/Throat:      Pharynx: No oropharyngeal exudate.   Eyes:      General: Lids are normal.         Right eye: No discharge.         Left eye: No discharge.      Conjunctiva/sclera: Conjunctivae normal.      Pupils:      Right eye: Pupil is reactive.      Left eye: Pupil is reactive.   Neck:      Thyroid: No thyroid mass or thyromegaly.      Vascular: No JVD.      Trachea: No tracheal deviation.   Cardiovascular:      Rate and Rhythm: Normal rate and regular rhythm.      Heart sounds: Normal heart sounds. No murmur heard.     Pulmonary:      Effort: Pulmonary effort is normal. No respiratory distress.      Breath sounds: Normal breath sounds. No wheezing.   Abdominal:      General: Bowel sounds are normal.      Palpations: Abdomen is soft.      Tenderness: There is no abdominal tenderness.   Musculoskeletal:         General: No tenderness.   Lymphadenopathy:      Cervical: No cervical adenopathy.   Skin:     General: Skin is warm and dry.      Findings: No erythema or rash.   Neurological:      Mental Status: She is alert and oriented to person, place, and time.      Comments: No hand tremor bilaterally   Psychiatric:         Mood and Affect: Mood normal.         Speech: Speech normal.         Behavior: Behavior normal.         Thought Content: Thought content normal.         Judgment: Judgment normal.           Labs and Imaging   Lab Results   Component Value Date    TSH 0.012 (L) 04/07/2021    THYROIDAB 19 10/13/2020         Assessment / Plan     Diagnoses and all orders for this visit:    1. Acquired hypothyroidism (Primary)  -     TSH  -     T4, Free    2. Unable to lose weight        Problem List Items Addressed This Visit        Other    Acquired hypothyroidism - Primary (Chronic)    Relevant Orders    TSH    T4, Free      Other Visit Diagnoses     Unable to  lose weight            Diagnosis was discussed and reviewed with the patient including the advantages of drug therapy.        1. Patient appears clinically hyperthyroid. Repeat labs with plan to decrease dose. Discussed weight gain unlikely related to thyroid- she has not lost any weight when TSH was around 1.0 or when it was undetectable. We will screen for hypercortisolism though suspicion is low. She has dexamethasone pill at home.   2. Patient will return to our office in 3 months.   3. The risks and benefits of my recommendations, as well as other treatment options were discussed with the patient today. Questions were answered.    Urbano Helton PA-C

## 2021-09-08 ENCOUNTER — OFFICE VISIT (OUTPATIENT)
Dept: INTERNAL MEDICINE | Facility: CLINIC | Age: 61
End: 2021-09-08

## 2021-09-08 VITALS
TEMPERATURE: 97.4 F | BODY MASS INDEX: 22.68 KG/M2 | RESPIRATION RATE: 16 BRPM | HEIGHT: 63 IN | HEART RATE: 72 BPM | DIASTOLIC BLOOD PRESSURE: 98 MMHG | OXYGEN SATURATION: 98 % | SYSTOLIC BLOOD PRESSURE: 162 MMHG | WEIGHT: 128 LBS

## 2021-09-08 DIAGNOSIS — E89.40 SURGICAL MENOPAUSE ON HORMONE REPLACEMENT THERAPY: ICD-10-CM

## 2021-09-08 DIAGNOSIS — R63.5 WEIGHT GAIN: ICD-10-CM

## 2021-09-08 DIAGNOSIS — I10 ESSENTIAL HYPERTENSION: Primary | ICD-10-CM

## 2021-09-08 DIAGNOSIS — M25.511 RIGHT SHOULDER PAIN, UNSPECIFIED CHRONICITY: ICD-10-CM

## 2021-09-08 DIAGNOSIS — E03.9 ACQUIRED HYPOTHYROIDISM: Chronic | ICD-10-CM

## 2021-09-08 DIAGNOSIS — Z79.890 SURGICAL MENOPAUSE ON HORMONE REPLACEMENT THERAPY: ICD-10-CM

## 2021-09-08 PROBLEM — I25.10 CAD S/P PERCUTANEOUS CORONARY ANGIOPLASTY: Status: ACTIVE | Noted: 2021-09-08

## 2021-09-08 PROBLEM — Z98.61 CAD S/P PERCUTANEOUS CORONARY ANGIOPLASTY: Status: ACTIVE | Noted: 2021-09-08

## 2021-09-08 PROBLEM — E78.2 MIXED HYPERLIPIDEMIA: Status: ACTIVE | Noted: 2019-01-23

## 2021-09-08 PROCEDURE — 99204 OFFICE O/P NEW MOD 45 MIN: CPT | Performed by: STUDENT IN AN ORGANIZED HEALTH CARE EDUCATION/TRAINING PROGRAM

## 2021-09-08 RX ORDER — METHOCARBAMOL 750 MG/1
TABLET, FILM COATED ORAL
COMMUNITY
Start: 2021-08-05 | End: 2021-09-28

## 2021-09-08 RX ORDER — OMEPRAZOLE 20 MG/1
20 CAPSULE, DELAYED RELEASE ORAL DAILY
COMMUNITY
Start: 2021-07-29 | End: 2021-10-13 | Stop reason: SDUPTHER

## 2021-09-08 NOTE — PROGRESS NOTES
New Patient Office Visit      Date: 2021      Patient Name: Radha White  : 1960   MRN: [id number]     Chief Complaint:    Chief Complaint   Patient presents with   • Establish Care   • Heart Problem     Pt states having 2 stents        History of Present Illness: Radha White is a 61 y.o. female who presents to clinic today to establish care.  Her medical history is significant for coronary artery disease status post 2 stents placed in 2018.  She also had acute liver failure due to NSAIDs that she was hospitalized for.  She has a single kidney after nephrectomy in 1965.  She has been under a lot of stress lately and feels some symptoms of depression.  She denies SI.  She prefers to not be on a medication for this as she had adverse side effects previously.    Hypothyroid   She reports taking medications daily as prescribed.  Last TSH was 0.02 and Free T4 was 1.67 .  She reports continued symptoms she relates to hypothyroidism that are similar to previous times when her TSH has been high.  She notes continued weight gain despite increasing exercise and eating a very healthy diet.  She also notes constipation, brittle nails, and notes mild hair loss.  Continued fatigue causes her to take multiple naps during the day which is not her usual.  She takes her medication as prescribed.    Dysphagia   She was previously started on omeprazole for this and symptoms have improved so she plans to stop this medication.  She has no symptoms of reflux, dyspepsia, or dysphagia.  No unintentional weight loss.     Right Shoulder Pain   Pain began after large umbrella in her backyard was blowing away in the wind and she was trying to catch it.  She was seen by her prior primary care doctor.  X-rays were obtained but she did not receive the results.  She was referred to physical therapy but it was the wrong location and the referral was not changed to her local physical therapist in [facility].  Pain was severe at  first, limiting range of motion but it has since improved with only occasional pain that she takes Tylenol for.    Surgical menopause  She had a total abdominal hysterectomy with bilateral salpingooophorectomy in 1997.  She has been on hormone replacement therapy since that time without issue.  She is a non-smoker.       HTN   She monitors blood pressure at home with BP usually running 125-135/80-85. Reports taking medications daily as prescribed and denies adverse side effect.    Has recently increased exercise and follows a healthy diet with mostly grilled chicken and seafood. Follows a reduced Na diet.  She denies dizziness, chest pain, palpitations, LE edema.     Nosebleeds  These do not occur often.  Occasionally notices blood clots in her nose.        Subjective     Review of System: Review of Systems   Constitutional: Positive for unexpected weight change.   HENT: Positive for congestion, hearing loss and nosebleeds.    Eyes: Positive for visual disturbance.   Respiratory: Negative.    Cardiovascular: Negative.    Gastrointestinal: Positive for constipation.   Endocrine: Negative.    Genitourinary: Positive for frequency.   Musculoskeletal: Positive for joint swelling and neck pain.   Skin: Negative.    Allergic/Immunologic: Negative.    Neurological: Negative.    Hematological: Negative.    Psychiatric/Behavioral: Negative.       I have reviewed the ROS documented by my clinical staff, updated appropriately and I agree. Angela Collado MD    Past Medical History:   Past Medical History:   Diagnosis Date   • Acid reflux    • Arthritis    • Gout    • Heart disease    • High cholesterol    • Hypertension    • Hypothyroidism        Past Surgical History:   Past Surgical History:   Procedure Laterality Date   • APPENDECTOMY     • BACK SURGERY     • CARDIAC SURGERY      2 stints   • HYSTERECTOMY     • LIVER BIOPSY      x 2   • NEPHRECTOMY         Family History:   Family History   Problem Relation Age of Onset   •  Arthritis Mother    • Diabetes Father    • Heart attack Father    • Hypertension Father        Social History:   Social History     Socioeconomic History   • Marital status:      Spouse name: Not on file   • Number of children: Not on file   • Years of education: Not on file   • Highest education level: Not on file   Tobacco Use   • Smoking status: Never Smoker   • Smokeless tobacco: Never Used   • Tobacco comment: some in high school    Substance and Sexual Activity   • Alcohol use: Yes     Comment: Occasionally    • Drug use: Defer   • Sexual activity: Defer       Medications:     Current Outpatient Medications:   •  aspirin (aspirin) 81 MG EC tablet, Take 1 capsule twice a day by oral route for 30 days., Disp: , Rfl:   •  ciclopirox (PENLAC) 8 % solution, ciclopirox 8 % topical solution  APPLY TO AND UNDER THE AFFECTED TOENAILS ONCE DAILY, Disp: , Rfl:   •  clopidogrel (PLAVIX) 75 MG tablet, clopidogrel 75 mg tablet, Disp: , Rfl:   •  dexamethasone (DECADRON) 1 MG tablet, Take 1mg x 1 at 11 pm the night before cortisol checked. Cortisol should be checked at 8AM the following day, Disp: 1 tablet, Rfl: 0  •  estradiol (ESTRACE) 1 MG tablet, Take 1 mg by mouth Daily., Disp: , Rfl:   •  furosemide (LASIX) 40 MG tablet, furosemide 40 mg tablet, Disp: , Rfl:   •  labetalol (NORMODYNE) 300 MG tablet, labetalol 300 mg tablet  Take 1 tablet every day by oral route., Disp: , Rfl:   •  lisinopril (PRINIVIL,ZESTRIL) 10 MG tablet, lisinopril 10 mg tablet, Disp: , Rfl:   •  methocarbamol (ROBAXIN) 750 MG tablet, TAKE 1 TABLET BY MOUTH THREE TIMES DAILY FOR 10 DAYS, Disp: , Rfl:   •  multivitamin with minerals tablet tablet, Take 1 tablet by mouth Daily., Disp: , Rfl:   •  omeprazole (priLOSEC) 20 MG capsule, Take 20 mg by mouth Daily., Disp: , Rfl:   •  potassium chloride (Klor-Con) 20 MEQ packet, Take 20 mEq by mouth As Needed., Disp: , Rfl:   •  Probiotic Product (Pro-biotic Blend) capsule, Take  by mouth., Disp: ,  "Rfl:   •  Tirosint 175 MCG capsule, Take 75 mcg by mouth Daily., Disp: 90 capsule, Rfl: 1  •  Ubiquinol 100 MG capsule, coenzyme Q10 (ubiquinol) 100 mg capsule  Take 2 capsules by oral route for 30 days., Disp: , Rfl:     Allergies:   Allergies   Allergen Reactions   • Nsaids Unknown - High Severity       Objective     Physical Exam:   Vital Signs:   Vitals:    09/08/21 1352   BP: 162/98   Pulse: 72   Resp: 16   Temp: 97.4 °F (36.3 °C)   SpO2: 98%   Weight: 58.1 kg (128 lb)   Height: 160 cm (63\")   PainSc: 0-No pain     Body mass index is 22.67 kg/m².     Physical Exam  Vitals and nursing note reviewed.   Constitutional:       General: She is not in acute distress.     Appearance: Normal appearance. She is not ill-appearing.   Eyes:      Extraocular Movements: Extraocular movements intact.      Conjunctiva/sclera: Conjunctivae normal.      Pupils: Pupils are equal, round, and reactive to light.   Neck:      Vascular: No carotid bruit.   Cardiovascular:      Rate and Rhythm: Normal rate and regular rhythm.      Pulses: Normal pulses.      Heart sounds: Normal heart sounds. No murmur heard.     Pulmonary:      Effort: Pulmonary effort is normal. No respiratory distress.      Breath sounds: Normal breath sounds. No wheezing, rhonchi or rales.   Abdominal:      General: Abdomen is flat. Bowel sounds are normal.      Palpations: Abdomen is soft.   Musculoskeletal:         General: No swelling.      Cervical back: Normal range of motion and neck supple.      Right lower leg: No edema.      Left lower leg: No edema.   Lymphadenopathy:      Cervical: No cervical adenopathy.   Skin:     General: Skin is warm and dry.      Capillary Refill: Capillary refill takes more than 3 seconds.      Findings: No bruising or rash.   Neurological:      General: No focal deficit present.      Mental Status: She is alert and oriented to person, place, and time.      Cranial Nerves: Cranial nerves are intact.      Sensory: Sensation is " intact.      Motor: Motor function is intact.      Coordination: Coordination is intact.      Gait: Gait is intact.      Deep Tendon Reflexes: Reflexes are normal and symmetric.   Psychiatric:         Mood and Affect: Mood and affect normal.         Behavior: Behavior normal.         Assessment / Plan      Assessment/Plan:   Diagnoses and all orders for this visit:    1. Essential hypertension (Primary)  - BP elevated in clinic but at home BP measurements are at goal.  - Continue current BP regimen.    2. Acquired hypothyroidism  - Most recent TSH was low but patient notes symptoms she relates to hypothyroidism.  Plan to reach out to the endocrinologist she sees to talk about symptoms.  - Continue current regimen for now.  Follow-up in 1 month.    3. Weight gain  - Unclear etiology.  BMI is normal but she notes a 12 to 15 pound unintentional weight gain. patient reports increasing exercise and low calorie diet but continues to gain weight.  She reports this is similar to prior issues with her thyroid.  Follow-up in 1 month and if weight gain has continued we will discuss further work-up and management.  In the meantime, plan to discuss patient with endocrinologist.    4. Right shoulder pain, unspecified chronicity   - Secondary to trauma several months ago.  Pain was severe initially but has steadily improved without intervention.  Continue Tylenol at low dose due to liver disease.  Avoid NSAIDs.  - Counseled patient that if pain worsens, referral to physical therapy in [facility] can be placed and may be beneficial.    5. Surgical menopause on hormone replacement therapy  - Patient has been on estradiol since 1997 and would like to trial off of this medication.  Will discuss taper of this medication over time.  Plan to decrease the estrogen by one pill per week every few weeks (ie, six pills per week for two weeks, then five pills per week for two weeks, etc) until she runs out of pills.  -Counseled that symptoms of  menopause may occur after stopping HRT.  If the symptoms are impacting her daily life, encouraged the patient to call and return to clinic to discuss risks and benefits of restarting HRT.  We did discuss that one of the main risks of HRT is blood clots.  She has no history of blood clots and she is a non-smoker.    Plan to obtain records from other care providers including Dr. Demetri Yang at [facility] (cardiologist), Dr. Thai Carpenter at  (hepatologist), and records from prior primary care doctor, Dr. Irby, at [facility].  After reviewing these records and speaking with the patient's endocrinologist, would like to see her back in 1 month for an annual exam and to discuss any changes needed in her management..            Follow Up:   Return in about 4 weeks (around 10/6/2021) for Annual.    Time:   I spent approximately 45 minutes providing clinical care for this patient; including review of patient's chart and provider documentation, face to face time spent with patient in examination room (obtaining history, performing physical exam, discussing diagnosis and management options), placing orders, and completing patient documentation.     Angela Collado MD  [facility]

## 2021-09-13 ENCOUNTER — TELEPHONE (OUTPATIENT)
Dept: INTERNAL MEDICINE | Facility: CLINIC | Age: 61
End: 2021-09-13

## 2021-09-14 ENCOUNTER — PATIENT ROUNDING (BHMG ONLY) (OUTPATIENT)
Dept: INTERNAL MEDICINE | Facility: CLINIC | Age: 61
End: 2021-09-14

## 2021-09-14 ENCOUNTER — TELEPHONE (OUTPATIENT)
Dept: INTERNAL MEDICINE | Facility: CLINIC | Age: 61
End: 2021-09-14

## 2021-09-14 RX ORDER — ROSUVASTATIN CALCIUM 20 MG/1
20 TABLET, COATED ORAL DAILY
Qty: 90 TABLET | Refills: 1
Start: 2021-09-14 | End: 2021-10-13 | Stop reason: SDUPTHER

## 2021-09-14 NOTE — TELEPHONE ENCOUNTER
Spoke with pt stated that she was concerned that Crestor was removed form Med list. She is currently taking medication and would like to make sure that is noted in her chart. Medication added and corrected.

## 2021-09-14 NOTE — PROGRESS NOTES
September 14, 2021    Hello, may I speak with Radha White?    My name is Denia Cedeno    I am  with [facility]  [address] [address] [phone].    Before we get started may I verify your date of birth? 1960    I am calling to officially welcome you to our practice and ask about your recent visit. Is this a good time to talk? yes    Tell me about your visit with us. What things went well?  Everything went great from the staff, office and the doctor. Very glad I made the switch.        We're always looking for ways to make our patients' experiences even better. Do you have recommendations on ways we may improve? No    Overall were you satisfied with your first visit to our practice? yes       I appreciate you taking the time to speak with me today. Is there anything else I can do for you? No      Thank you, and have a great day.

## 2021-09-28 ENCOUNTER — OFFICE VISIT (OUTPATIENT)
Dept: ENDOCRINOLOGY | Facility: CLINIC | Age: 61
End: 2021-09-28

## 2021-09-28 ENCOUNTER — LAB (OUTPATIENT)
Dept: LAB | Facility: HOSPITAL | Age: 61
End: 2021-09-28

## 2021-09-28 VITALS
SYSTOLIC BLOOD PRESSURE: 122 MMHG | DIASTOLIC BLOOD PRESSURE: 80 MMHG | HEART RATE: 79 BPM | BODY MASS INDEX: 22.61 KG/M2 | HEIGHT: 63 IN | OXYGEN SATURATION: 98 % | WEIGHT: 127.6 LBS

## 2021-09-28 DIAGNOSIS — E03.9 ACQUIRED HYPOTHYROIDISM: Primary | Chronic | ICD-10-CM

## 2021-09-28 DIAGNOSIS — R63.8 UNABLE TO LOSE WEIGHT: ICD-10-CM

## 2021-09-28 PROCEDURE — 99214 OFFICE O/P EST MOD 30 MIN: CPT | Performed by: PHYSICIAN ASSISTANT

## 2021-09-28 PROCEDURE — 84439 ASSAY OF FREE THYROXINE: CPT | Performed by: PHYSICIAN ASSISTANT

## 2021-09-28 PROCEDURE — 80053 COMPREHEN METABOLIC PANEL: CPT | Performed by: PHYSICIAN ASSISTANT

## 2021-09-28 PROCEDURE — 84443 ASSAY THYROID STIM HORMONE: CPT | Performed by: PHYSICIAN ASSISTANT

## 2021-09-28 NOTE — PROGRESS NOTES
Chief Complaint:   Radha White is a 61 y.o. female who is being seen today for  Hypothyroidism .    HPI     59 yo female with CAD s/p remote stents followed by Dr. Demetri Yang, hypertension, hyperlipidemia, hx of nephrectomy 1965, complete hysterectomy 1996, following up on hypothyroidism today.   10/2020- we changed Synthroid 337 to Tirosint 275 due to concern for malabsorption with such a high dose.    1/2020- repeat labs showed undetectable TSH with high FT4. We decreased Tirosint to 275 mcg daily and have continued decreasing the dose gradually since. She is currently on 175 mcg daily.     Pt continue to c/o inability to lose weight. Weight is mostly stable, 125-128 pounds for the past year. Is very frustrated with this. Is always tired. She still has night sweats but overall improved. No palpitations or tremors.   Did not have a chance to complete dex suppression test.   She is coming off HRT.  Is coming off PPI.     Had liver failure in 2007- work up was normal and it was attributed to naproxen. If following with  now, has liver fibrosis. She was told zetia and vascepa may have affected her liver function as well.     Previous medication: levothyroxine (worked for a long time, but eventually labs stopped responding to higher doses of generic), synthroid brand    The following portions of the patient's history were reviewed and updated by me as appropriate: allergies, current medications, past family history, past social history, past surgical history and problem list.      Review of Systems  Review of Systems   Constitutional:        Unable to lose weight   Endocrine:        Hot flashes   All other systems reviewed and are negative.       Current medications:  Current Outpatient Medications   Medication Sig Dispense Refill   • aspirin (aspirin) 81 MG EC tablet Take 1 capsule twice a day by oral route for 30 days.     • ciclopirox (PENLAC) 8 % solution ciclopirox 8 % topical solution   APPLY TO AND  UNDER THE AFFECTED TOENAILS ONCE DAILY     • clopidogrel (PLAVIX) 75 MG tablet clopidogrel 75 mg tablet     • estradiol (ESTRACE) 1 MG tablet Take 1 mg by mouth Daily.     • furosemide (LASIX) 40 MG tablet furosemide 40 mg tablet     • labetalol (NORMODYNE) 300 MG tablet labetalol 300 mg tablet   Take 1 tablet every day by oral route.     • lisinopril (PRINIVIL,ZESTRIL) 10 MG tablet lisinopril 10 mg tablet     • multivitamin with minerals tablet tablet Take 1 tablet by mouth Daily.     • omeprazole (priLOSEC) 20 MG capsule Take 20 mg by mouth Daily.     • potassium chloride (Klor-Con) 20 MEQ packet Take 20 mEq by mouth As Needed.     • Probiotic Product (Pro-biotic Blend) capsule Take  by mouth.     • rosuvastatin (Crestor) 20 MG tablet Take 1 tablet by mouth Daily. 90 tablet 1   • Tirosint 175 MCG capsule Take 75 mcg by mouth Daily. 90 capsule 1     No current facility-administered medications for this visit.       Physical Exam   Vitals:    09/28/21 1129   BP: 122/80   Pulse: 79   SpO2: 98%   Body mass index is 22.6 kg/m².  Physical Exam  Constitutional:       General: She is not in acute distress.     Appearance: She is well-developed. She is not diaphoretic.   HENT:      Head: Normocephalic.      Right Ear: External ear normal.      Left Ear: External ear normal.      Mouth/Throat:      Pharynx: No oropharyngeal exudate.   Eyes:      General: Lids are normal.         Right eye: No discharge.         Left eye: No discharge.      Conjunctiva/sclera: Conjunctivae normal.      Pupils:      Right eye: Pupil is reactive.      Left eye: Pupil is reactive.   Neck:      Thyroid: No thyroid mass or thyromegaly.      Vascular: No JVD.      Trachea: No tracheal deviation.   Cardiovascular:      Rate and Rhythm: Normal rate and regular rhythm.      Heart sounds: Normal heart sounds. No murmur heard.     Pulmonary:      Effort: Pulmonary effort is normal. No respiratory distress.      Breath sounds: Normal breath sounds. No  wheezing.   Abdominal:      General: Bowel sounds are normal.      Palpations: Abdomen is soft.      Tenderness: There is no abdominal tenderness.   Musculoskeletal:         General: No tenderness.   Lymphadenopathy:      Cervical: No cervical adenopathy.   Skin:     General: Skin is warm and dry.      Findings: No erythema or rash.   Neurological:      Mental Status: She is alert and oriented to person, place, and time.      Comments: No hand tremor bilaterally   Psychiatric:         Mood and Affect: Mood normal.         Speech: Speech normal.         Behavior: Behavior normal.         Thought Content: Thought content normal.         Judgment: Judgment normal.           Labs and Imaging   Lab Results   Component Value Date    TSH 0.020 (L) 06/04/2021    THYROIDAB 19 10/13/2020         Assessment / Plan     Diagnoses and all orders for this visit:    1. Acquired hypothyroidism (Primary)  -     TSH  -     T4, Free    2. Unable to lose weight  -     Comprehensive Metabolic Panel  -     Salivary Cortisol X2, Timed; Future        Problem List Items Addressed This Visit        Other    Acquired hypothyroidism - Primary (Chronic)    Relevant Orders    TSH    T4, Free      Other Visit Diagnoses     Unable to lose weight        Relevant Orders    Comprehensive Metabolic Panel    Salivary Cortisol X2, Timed        Diagnosis was discussed and reviewed with the patient including the advantages of drug therapy.        1. Patient appears clinically euthyroid. Repeat labs. Discussed weight gain unlikely related to thyroid- she has not lost any weight when TSH was around 1.0 or when it was undetectable. We will screen for hypercortisolism though suspicion is low. She was not able to complete dex suppression test as it requires being at the labs at 8 AM, will check midnight salivary cortisol instead.   2. Patient will return to our office in 3 months.   3. The risks and benefits of my recommendations, as well as other treatment  options were discussed with the patient today. Questions were answered.    Urbano Helton PA-C

## 2021-09-29 DIAGNOSIS — E03.9 ACQUIRED HYPOTHYROIDISM: Primary | ICD-10-CM

## 2021-09-29 LAB
ALBUMIN SERPL-MCNC: 4.9 G/DL (ref 3.5–5.2)
ALBUMIN/GLOB SERPL: 1.8 G/DL
ALP SERPL-CCNC: 118 U/L (ref 39–117)
ALT SERPL W P-5'-P-CCNC: 59 U/L (ref 1–33)
ANION GAP SERPL CALCULATED.3IONS-SCNC: 13.9 MMOL/L (ref 5–15)
AST SERPL-CCNC: 75 U/L (ref 1–32)
BILIRUB SERPL-MCNC: 0.4 MG/DL (ref 0–1.2)
BUN SERPL-MCNC: 19 MG/DL (ref 8–23)
BUN/CREAT SERPL: 23.8 (ref 7–25)
CALCIUM SPEC-SCNC: 9.9 MG/DL (ref 8.6–10.5)
CHLORIDE SERPL-SCNC: 100 MMOL/L (ref 98–107)
CO2 SERPL-SCNC: 21.1 MMOL/L (ref 22–29)
CREAT SERPL-MCNC: 0.8 MG/DL (ref 0.57–1)
GFR SERPL CREATININE-BSD FRML MDRD: 73 ML/MIN/1.73
GLOBULIN UR ELPH-MCNC: 2.7 GM/DL
GLUCOSE SERPL-MCNC: 98 MG/DL (ref 65–99)
POTASSIUM SERPL-SCNC: 4.5 MMOL/L (ref 3.5–5.2)
PROT SERPL-MCNC: 7.6 G/DL (ref 6–8.5)
SODIUM SERPL-SCNC: 135 MMOL/L (ref 136–145)
T4 FREE SERPL-MCNC: 1.45 NG/DL (ref 0.93–1.7)
TSH SERPL DL<=0.05 MIU/L-ACNC: 0.69 UIU/ML (ref 0.27–4.2)

## 2021-09-30 RX ORDER — LIOTHYRONINE SODIUM 5 UG/1
TABLET ORAL
Qty: 15 TABLET | Refills: 4 | Status: SHIPPED | OUTPATIENT
Start: 2021-09-30 | End: 2021-10-18 | Stop reason: SDUPTHER

## 2021-10-13 ENCOUNTER — OFFICE VISIT (OUTPATIENT)
Dept: INTERNAL MEDICINE | Facility: CLINIC | Age: 61
End: 2021-10-13

## 2021-10-13 ENCOUNTER — HOSPITAL ENCOUNTER (OUTPATIENT)
Dept: GENERAL RADIOLOGY | Facility: HOSPITAL | Age: 61
Discharge: HOME | End: 2021-10-13
Admitting: STUDENT IN AN ORGANIZED HEALTH CARE EDUCATION/TRAINING PROGRAM

## 2021-10-13 VITALS
BODY MASS INDEX: 22.96 KG/M2 | HEIGHT: 63 IN | WEIGHT: 129.6 LBS | SYSTOLIC BLOOD PRESSURE: 130 MMHG | HEART RATE: 60 BPM | OXYGEN SATURATION: 98 % | DIASTOLIC BLOOD PRESSURE: 78 MMHG | TEMPERATURE: 98.2 F

## 2021-10-13 DIAGNOSIS — E03.9 ACQUIRED HYPOTHYROIDISM: Chronic | ICD-10-CM

## 2021-10-13 DIAGNOSIS — G89.29 CHRONIC PAIN OF BOTH SHOULDERS: ICD-10-CM

## 2021-10-13 DIAGNOSIS — M25.511 CHRONIC PAIN OF BOTH SHOULDERS: ICD-10-CM

## 2021-10-13 DIAGNOSIS — E89.40 SURGICAL MENOPAUSE: ICD-10-CM

## 2021-10-13 DIAGNOSIS — M54.12 CERVICAL RADICULOPATHY: ICD-10-CM

## 2021-10-13 DIAGNOSIS — M54.12 CERVICAL RADICULOPATHY: Primary | ICD-10-CM

## 2021-10-13 DIAGNOSIS — M25.512 CHRONIC PAIN OF BOTH SHOULDERS: ICD-10-CM

## 2021-10-13 DIAGNOSIS — I10 PRIMARY HYPERTENSION: ICD-10-CM

## 2021-10-13 DIAGNOSIS — R63.5 UNINTENDED WEIGHT GAIN: ICD-10-CM

## 2021-10-13 PROBLEM — K76.9 LIVER DAMAGE: Status: ACTIVE | Noted: 2021-10-13

## 2021-10-13 PROCEDURE — 72040 X-RAY EXAM NECK SPINE 2-3 VW: CPT

## 2021-10-13 PROCEDURE — 73030 X-RAY EXAM OF SHOULDER: CPT

## 2021-10-13 PROCEDURE — 99214 OFFICE O/P EST MOD 30 MIN: CPT | Performed by: STUDENT IN AN ORGANIZED HEALTH CARE EDUCATION/TRAINING PROGRAM

## 2021-10-13 RX ORDER — ROSUVASTATIN CALCIUM 40 MG/1
TABLET, COATED ORAL
COMMUNITY
Start: 2021-10-12

## 2021-10-13 RX ORDER — GABAPENTIN 100 MG/1
100 CAPSULE ORAL NIGHTLY
Qty: 30 CAPSULE | Refills: 0 | Status: SHIPPED | OUTPATIENT
Start: 2021-10-13 | End: 2021-10-21 | Stop reason: SDUPTHER

## 2021-10-13 NOTE — PROGRESS NOTES
Internal Medicine Established Office Visit      Date: 10/13/2021     Patient Name: Radha White  : 1960   MRN: [id number]     Chief Complaint:    Chief Complaint   Patient presents with   • Follow-up   • Hypertension       History of Present Illness: Radha White is a 61 y.o. female who presents to clinic today for follow-up.  Her biggest concern is ongoing bilateral shoulder pain and C-spine pain.  She describes the pain as severe which makes it difficult to sleep at night and hard to get out of bed in the morning.  She describes numbness and tingling in her lower extremities.  The pain can radiate down her left upper extremity.  Sometimes her  strength is affected.  She is unable to take NSAIDs due to prior liver injury.  She has attempted to use sleep apps to help with sleep at night and has repeated prior exercises that a physical therapist has shown her.  Neither help.    Surgical Menopause   Has now stopped HRT (was only on estrogen due to [place]).  Has occasional hot flashes.  Having sleep disturbances.    Hypothyroid   She reports taking medications daily as prescribed.  Last TSH was 0.695.  Currently denies heat/cold intolerance, constipation, diarrhea, palpitations, weight loss, changes in skin, hair, and nails.   Has had ongoing unintentional weight gain, 2 pounds reported since last appointment.    Cervical radiculopathy  Had right shoulder pain after trauma (was running after large umbrella that was blowing away).  Now she notes pain in her neck that radiates down to her left shoulder and left arm.  She occasionally has some tingling in her bilateral lower extremities.  Decreased  strength in upper extremities occasionally.  No saddle anesthesia, fecal incontinence, urinary retention.  No fevers or chills.      Subjective     Review of System: Review of Systems   Constitutional: Negative for fatigue and unexpected weight change.   HENT: Negative for congestion, hearing loss,  postnasal drip, rhinorrhea and sore throat.    Eyes: Negative for visual disturbance.   Respiratory: Negative for cough and shortness of breath.    Cardiovascular: Negative for chest pain, palpitations and leg swelling.   Gastrointestinal: Positive for nausea. Negative for abdominal distention, abdominal pain, blood in stool, constipation, diarrhea and vomiting.   Endocrine: Negative for cold intolerance, heat intolerance, polydipsia and polyuria.   Genitourinary: Negative for difficulty urinating, dysuria, frequency, hematuria and urgency.   Musculoskeletal: Positive for arthralgias. Negative for back pain, joint swelling and myalgias.   Skin: Negative for rash and wound.   Allergic/Immunologic: Negative for environmental allergies.   Neurological: Positive for headaches. Negative for dizziness, syncope, weakness and numbness.   Hematological: Does not bruise/bleed easily.   Psychiatric/Behavioral: Positive for decreased concentration and sleep disturbance. Negative for agitation. The patient is not nervous/anxious.       I have reviewed the ROS documented by my clinical staff, updated appropriately and I agree. Angela Collado MD    I have reviewed and the following portions of the patient's history were updated as appropriate: past family history, past medical history, past social history, past surgical history and problem list.     Medications:     Current Outpatient Medications:   •  aspirin (aspirin) 81 MG EC tablet, Take 1 capsule twice a day by oral route for 30 days., Disp: , Rfl:   •  ciclopirox (PENLAC) 8 % solution, ciclopirox 8 % topical solution  APPLY TO AND UNDER THE AFFECTED TOENAILS ONCE DAILY, Disp: , Rfl:   •  clopidogrel (PLAVIX) 75 MG tablet, clopidogrel 75 mg tablet, Disp: , Rfl:   •  furosemide (LASIX) 40 MG tablet, furosemide 40 mg tablet, Disp: , Rfl:   •  labetalol (NORMODYNE) 300 MG tablet, labetalol 300 mg tablet  Take 1 tablet every day by oral route., Disp: , Rfl:   •  liothyronine  "(CYTOMEL) 5 MCG tablet, Take 1/2 a tab daily, Disp: 15 tablet, Rfl: 4  •  lisinopril (PRINIVIL,ZESTRIL) 10 MG tablet, lisinopril 10 mg tablet, Disp: , Rfl:   •  multivitamin with minerals tablet tablet, Take 1 tablet by mouth Daily., Disp: , Rfl:   •  potassium chloride (Klor-Con) 20 MEQ packet, Take 20 mEq by mouth As Needed., Disp: , Rfl:   •  Probiotic Product (Pro-biotic Blend) capsule, Take  by mouth., Disp: , Rfl:   •  rosuvastatin (CRESTOR) 40 MG tablet, , Disp: , Rfl:   •  Tirosint 175 MCG capsule, Take 75 mcg by mouth Daily., Disp: 90 capsule, Rfl: 1    Allergies:   Allergies   Allergen Reactions   • Nsaids Unknown - High Severity       Objective     Physical Exam:   Vital Signs:   Vitals:    10/13/21 1031   BP: 130/78   Pulse: 60   Temp: 98.2 °F (36.8 °C)   SpO2: 98%   Weight: 58.8 kg (129 lb 9.6 oz)   Height: 160 cm (63\")   PainSc: 0-No pain     Body mass index is 22.96 kg/m².     Physical Exam  Vitals and nursing note reviewed.   Constitutional:       Appearance: Normal appearance.   Cardiovascular:      Rate and Rhythm: Normal rate and regular rhythm.      Heart sounds: Normal heart sounds.   Pulmonary:      Effort: Pulmonary effort is normal.      Breath sounds: Normal breath sounds. No wheezing, rhonchi or rales.   Musculoskeletal:      Right shoulder: Tenderness present. Decreased range of motion.      Left shoulder: Tenderness present. Decreased range of motion.      Cervical back: No swelling, deformity or signs of trauma. Pain with movement present.      Right lower leg: No edema.      Left lower leg: No edema.   Skin:     General: Skin is warm and dry.   Neurological:      General: No focal deficit present.      Mental Status: She is alert and oriented to person, place, and time.      Sensory: No sensory deficit.      Motor: No weakness.      Gait: Gait normal.   Psychiatric:         Mood and Affect: Mood normal.         Behavior: Behavior normal.          Assessment / Plan      Assessment/Plan: "   Diagnoses and all orders for this visit:    1.  Cervical radiculopathy  C-spine pain that radiates down left upper extremity with decreased  strength and lower extremity numbness and tingling.  No red flag symptoms.  C-spine x-ray ordered and pain management referral placed.  Offered referral for physical therapy which patient politely declines.  We will also provide with short course of gabapentin 100 mg nightly.  This may help with radiculopathy as well as menopause symptoms at night particularly now that she is off hormone replacement.  JAN has been reviewed by me with no other recent prescriptions on report. The patient is aware of the potential for addiction and dependence.  Counseled patient on common side effects including dizziness and drowsiness.  We will trial this medicine for a month.  If this medication is helpful she will return to fill out a controlled substance contract to receive refills.    2. Primary hypertension (Primary)  Well-controlled.  Continue labetalol 300 mg and lisinopril 10 mg    3. Acquired hypothyroidism  Last seen by endocrinology on 9/28/2021.  Most recent TSH from that time is within normal limits.  She continues to note weight gain.  She was started on liothyronine 5 mcg in addition to Tirosint 175 mcg.     4. Unintended weight gain  This is likely multifactorial in the setting of discontinuation of hormone replacement therapy and decreased physical activity due to cervical radiculopathy and bilateral shoulder pain.  With pain control, increasing physical activity may help with this.  She reports healthy diet and small portions.    5. Surgical menopause  No longer on hormone replacement therapy.  Contraindicated due to CAD with stent placement.  Also prefer not to continue this medication for greater than 5 years.  Due to difficulty sleeping at night which can occur in menopause or after removal of HRT, will trial gabapentin 100 mg nightly. JAN has been reviewed by me  with no other recent prescriptions on report. The patient is aware of the potential for addiction and dependence.  Counseled patient on common side effects including dizziness and drowsiness.  We will trial this medicine for a month.  If this medication is helpful she will return to fill out a controlled substance contract to receive refills.    6.  Chronic pain of both shoulders  X-ray of left shoulder ordered.  Referral placed to pain management.  Offered referral to physical therapy which patient politely declines.     Follow Up:   No follow-ups on file.    Time:  I spent approximately 30 minutes providing clinical care for this patient; including review of patient's chart and provider documentation, face to face time spent with patient in examination room (obtaining history, performing physical exam, discussing diagnosis and management options), placing orders, and completing patient documentation.     2 chronic stable conditions have been assessed and prescription drug management complete consistent with moderate MDM.    Addendum (10/20/2021): C-spine x-ray and MRI showed degenerative disc disease and some mild spinal stenosis.  She was referred to pain management with an appointment in late November 2021.  She has been taking gabapentin 100 mg nightly and finds that it helps initially but awakens with pain later in the evening.  She also notes increasing pain throughout the day which is limiting ADLs.  Recommended patient return to clinic for evaluation and to discuss possibly increasing gabapentin to 300 mg 3 times daily.  To move forward with this, patient would complete a controlled substance agreement.  Recommended to increase dosing of gabapentin 100 mg from nightly to 3 times daily until reevaluation in clinic.  There are openings available the rest of this week and early next week for patient to come in at her convenience.    Angela Collado MD  [facility]

## 2021-10-15 ENCOUNTER — HOSPITAL ENCOUNTER (OUTPATIENT)
Dept: MRI IMAGING | Facility: HOSPITAL | Age: 61
Discharge: HOME | End: 2021-10-15
Admitting: STUDENT IN AN ORGANIZED HEALTH CARE EDUCATION/TRAINING PROGRAM

## 2021-10-15 ENCOUNTER — TELEPHONE (OUTPATIENT)
Dept: INTERNAL MEDICINE | Facility: CLINIC | Age: 61
End: 2021-10-15

## 2021-10-15 DIAGNOSIS — M54.12 CERVICAL RADICULOPATHY: Primary | ICD-10-CM

## 2021-10-15 DIAGNOSIS — M54.12 CERVICAL RADICULOPATHY: ICD-10-CM

## 2021-10-15 PROCEDURE — 72141 MRI NECK SPINE W/O DYE: CPT

## 2021-10-15 NOTE — TELEPHONE ENCOUNTER
Patient was calling to speak to a nurse regarding medication. She has sent a [employer] message as well but was just wanting a call back to discuss.    Please advise

## 2021-10-18 RX ORDER — LIOTHYRONINE SODIUM 5 UG/1
TABLET ORAL
Qty: 30 TABLET | Refills: 4 | Status: SHIPPED | OUTPATIENT
Start: 2021-10-18 | End: 2022-02-25 | Stop reason: SDUPTHER

## 2021-10-20 ENCOUNTER — PATIENT MESSAGE (OUTPATIENT)
Dept: INTERNAL MEDICINE | Facility: CLINIC | Age: 61
End: 2021-10-20

## 2021-10-20 ENCOUNTER — TELEPHONE (OUTPATIENT)
Dept: INTERNAL MEDICINE | Facility: CLINIC | Age: 61
End: 2021-10-20

## 2021-10-20 NOTE — TELEPHONE ENCOUNTER
DOES PT NEED TO SCHEDULE APPOINTMENT WITH DR BUSBY OR JUST COME IN TO SIGN CONTROLLED SUB AGREEMENT, PT WAS CONFUSED BY THE MESSAGE    PLEASE ADVISE

## 2021-10-21 ENCOUNTER — LAB (OUTPATIENT)
Dept: LAB | Facility: HOSPITAL | Age: 61
End: 2021-10-21

## 2021-10-21 ENCOUNTER — OFFICE VISIT (OUTPATIENT)
Dept: INTERNAL MEDICINE | Facility: CLINIC | Age: 61
End: 2021-10-21

## 2021-10-21 VITALS
DIASTOLIC BLOOD PRESSURE: 82 MMHG | WEIGHT: 128.6 LBS | OXYGEN SATURATION: 97 % | SYSTOLIC BLOOD PRESSURE: 136 MMHG | BODY MASS INDEX: 22.79 KG/M2 | TEMPERATURE: 97.6 F | HEART RATE: 75 BPM

## 2021-10-21 DIAGNOSIS — R35.0 URINARY FREQUENCY: ICD-10-CM

## 2021-10-21 DIAGNOSIS — R31.9 HEMATURIA, UNSPECIFIED TYPE: ICD-10-CM

## 2021-10-21 DIAGNOSIS — N17.9 AKI (ACUTE KIDNEY INJURY) (HCC): ICD-10-CM

## 2021-10-21 DIAGNOSIS — R32 URINARY INCONTINENCE IN FEMALE: ICD-10-CM

## 2021-10-21 DIAGNOSIS — M54.12 RADICULOPATHY OF CERVICAL SPINE: Primary | ICD-10-CM

## 2021-10-21 DIAGNOSIS — R80.9 PROTEINURIA, UNSPECIFIED TYPE: ICD-10-CM

## 2021-10-21 LAB
BILIRUB BLD-MCNC: ABNORMAL MG/DL
BILIRUB UR QL STRIP: NEGATIVE
CLARITY UR: CLEAR
CLARITY, POC: CLEAR
COLOR UR: ABNORMAL
COLOR UR: YELLOW
EXPIRATION DATE: ABNORMAL
GLUCOSE UR STRIP-MCNC: NEGATIVE MG/DL
GLUCOSE UR STRIP-MCNC: NEGATIVE MG/DL
HGB UR QL STRIP.AUTO: ABNORMAL
KETONES UR QL STRIP: ABNORMAL
KETONES UR QL: NEGATIVE
LEUKOCYTE EST, POC: NEGATIVE
LEUKOCYTE ESTERASE UR QL STRIP.AUTO: NEGATIVE
Lab: ABNORMAL
NITRITE UR QL STRIP: NEGATIVE
NITRITE UR-MCNC: NEGATIVE MG/ML
PH UR STRIP.AUTO: 5.5 [PH] (ref 5–8)
PH UR: 6 [PH] (ref 5–8)
PROT UR QL STRIP: ABNORMAL
PROT UR STRIP-MCNC: ABNORMAL MG/DL
RBC # UR STRIP: ABNORMAL /UL
SP GR UR STRIP: >=1.03 (ref 1–1.03)
SP GR UR: 1.03 (ref 1–1.03)
UROBILINOGEN UR QL STRIP: ABNORMAL
UROBILINOGEN UR QL: NORMAL

## 2021-10-21 PROCEDURE — 81003 URINALYSIS AUTO W/O SCOPE: CPT | Performed by: STUDENT IN AN ORGANIZED HEALTH CARE EDUCATION/TRAINING PROGRAM

## 2021-10-21 PROCEDURE — 99214 OFFICE O/P EST MOD 30 MIN: CPT | Performed by: STUDENT IN AN ORGANIZED HEALTH CARE EDUCATION/TRAINING PROGRAM

## 2021-10-21 PROCEDURE — 87086 URINE CULTURE/COLONY COUNT: CPT

## 2021-10-21 PROCEDURE — 81001 URINALYSIS AUTO W/SCOPE: CPT

## 2021-10-21 RX ORDER — GABAPENTIN 300 MG/1
300 CAPSULE ORAL 3 TIMES DAILY
Qty: 90 CAPSULE | Refills: 0 | Status: CANCELLED | OUTPATIENT
Start: 2021-10-21

## 2021-10-21 RX ORDER — GABAPENTIN 300 MG/1
300 CAPSULE ORAL 3 TIMES DAILY
Qty: 90 CAPSULE | Refills: 1 | Status: SHIPPED | OUTPATIENT
Start: 2021-10-21 | End: 2021-11-30

## 2021-10-21 RX ORDER — PREDNISONE 10 MG/1
10 TABLET ORAL DAILY
Qty: 55 TABLET | Refills: 0 | Status: SHIPPED | OUTPATIENT
Start: 2021-10-21 | End: 2022-01-24

## 2021-10-21 NOTE — PROGRESS NOTES
Internal Medicine Acute Visit     Patient Name: Radha White  : 1960   MRN: [id number]     Chief Complaint:    Chief Complaint   Patient presents with   • Neck Pain     medication changes   • Urinary Incontinence     increase in frequency, started last four days   • Constipation     about four days       History of Present Illness: Radha White is a 61 y.o. female who presents for worsening neck pain.  Pain is constant in nature but worsens with certain motion. Pain is present in her neck and radiates to her shoulder and down her arm.  She denies motor weakness, saddle anesthesia, urinary retention, and fecal incontinence.  She does however note constipation and urinary incontinence.  Urinary incontinence occurs at any time and is not related to cough, laugh, or sneeze.     Subjective     Review of System: Review of Systems   Gastrointestinal: Positive for constipation.   Musculoskeletal: Positive for back pain and neck pain.   Neurological: Negative for weakness.      I have reviewed the ROS documented by my clinical staff, updated appropriately and I agree. Angela Collado MD    I have reviewed and the following portions of the patient's history were updated as appropriate: past family history, past medical history, past social history, past surgical history and problem list.    Allergies:   Allergies   Allergen Reactions   • Nsaids Unknown - High Severity       Objective     Physical Exam:  Vital Signs:   Vitals:    10/21/21 1405   BP: 136/82   Pulse: 75   Temp: 97.6 °F (36.4 °C)   SpO2: 97%   Weight: 58.3 kg (128 lb 9.6 oz)   PainSc:   8     Body mass index is 22.79 kg/m².    Physical Exam  Vitals and nursing note reviewed.   Constitutional:       General: She is not in acute distress.     Appearance: Normal appearance.   HENT:      Head: Normocephalic and atraumatic.   Neck:      Comments: ROM  due to pain  Cardiovascular:      Rate and Rhythm: Normal rate and regular rhythm.       Pulses: Normal pulses.      Heart sounds: Normal heart sounds. No murmur heard.      Pulmonary:      Effort: Pulmonary effort is normal. No respiratory distress.      Breath sounds: Normal breath sounds. No wheezing, rhonchi or rales.   Musculoskeletal:         General: No swelling.      Right lower leg: No edema.      Left lower leg: No edema.   Skin:     General: Skin is warm and dry.      Findings: No rash.   Neurological:      General: No focal deficit present.      Mental Status: She is alert and oriented to person, place, and time.      Gait: Gait normal.   Psychiatric:         Mood and Affect: Mood normal.         Behavior: Behavior normal.         Assessment / Plan      Assessment/Plan:   Diagnoses and all orders for this visit:    1. Radiculopathy of cervical spine (Primary)  Not red flags but worsening pain despite gabapentin 100 mg nightly.  Pain management appointment is 1 month away. Increasing dose of gabapentin to 300 mg TID. The patient has read and signed the [facility] Controlled Substance Contract.  I will continue to see patient for regular follow up appointments.   JAN has been reviewed by me and will be updated every 3 months while the patient is on this medication. The patient is aware of the potential for addiction and dependence. Goal is to not continue this medication indefinitely but to help relieve some pain while awaiting pain management appointment. For further pain control, prednisone taper prescribed. 60 mg for 5 days, 40 mg for 4 days, 20 mg for 3 days, 10 mg for 2 days, 5 mg for 1 day. Counseled patient on possible side effects. Patient notes prior use of prednisone without issue.     2. Urinary incontinence in female  May be urge or overflow. Denies urination with cough, sneeze, laugh so unlikely to be stress. U/A and culture obtained in clinic as UTI can present like this in some patients. Will follow up with patient to ensure she feels like she is emptying bladder  completely to rule out overflow.  Patient taking Furosamide which can exacerbate incontinence.     3. Constipation   No fecal incontinence which would be red flag with radiculopathy. Recommended miralax.        Follow Up:   Return in about 8 weeks (around 12/16/2021) for Recheck.    Time:   I spent approximately 30 minutes providing clinical care for this patient; including review of patient's chart and provider documentation, face to face time spent with patient in examination room (obtaining history, performing physical exam, discussing diagnosis and management options), placing orders, and completing patient documentation.     Addendum (10/22/2021): POC urine dipstick obtained in clinic demonstrated blood and protein.  Repeat urinalysis sent to the lab demonstrated trace blood and protein.  However microscopy from the sample showed 0-2 RBCs and elevated squamous epithelial cells which can indicate poor collection which can affect results.  As patient had a unilateral nephrectomy previously, will repeat urinalysis with microscopy with a protein creatinine ratio and referral to nephrology for further evaluation and recommendations.  This information was relayed to the patient.    4. Proteinuria and Hematuria in patient with history of remote unilateral Nephrectomy  History of unilateral nephrectomy. Urine dipstick with protein and trace blood. Repeat urinalysis with microscopy demonstrated blood and protein as well. Microscopy demonstrated no RBCs (possible myoglobinuria? However, no other signs/symptoms that would be consistent with this as pain is more consistent with radiculopathy - will check CK to rule this out) but did have elevated squamous epithelial cells which can indicated a poor sample and affect results. Repeat urinalysis with microscopy ordered as well as protein/creatinine ratio.  If positive again, will order renal ultrasound. Referral placed to nephrology out of abundance of caution. CBC and CMP  ordered as well.     Addendum (10/26/2021): Creatinine elevated to 1.33 from 0.8 4 weeks ago.  No RBCs, WBCs, or casts seen on microscopy.  2+ protein again seen on urinalysis.  No prior urinalysis to review to see chronicity.  Will change referral to nephrology to urgent as patient has history of nephrectomy with one remaining kidney.  We will also order 24-hour urine protein to assess total amount of protein per day.  No edema, elevated blood pressure, or hematuria noted (blood noted in initial urine dipstick but no RBCs seen on multiple repeats - normal CK). Will update the patient with this plan today.     Addendum (11/12/2021): Records from recent nephrology appointment reviewed which noted good creatinine clearance, normal renal function, and only mild proteinuria.  She was reassured that biopsy was unlikely to be needed.  He is checking SPEP, UPEP, and autoimmune labs to be complete but feels the mild proteinuria is most likely to be due to hypertension.  She is currently on lisinopril 10 mg daily.  May be able to titrate this dose up based on home blood pressures which she notes to be typically 130s/80s.  She will follow up with him in 4 to 6 weeks.  She is awaiting appointment with pain management.  She was given an additional referral to pain management center with Dr. Jalen Davis and may be able to get a sooner appointment with him.  Plan to discuss urinary incontinence at next appointment.  She noted some nocturia (3-4 times per night) and additionally some incontinence with cough and sneeze consistent with stress incontinence.  Will discuss timing of furosemide dose as this may impact nocturia if taken at night.    Addendum (11/30/2021): Patient has seen pain management and documentation reviewed.  As gabapentin does not seem to be improving pain, pain management recommended trying Lyrica.  Patient has requested this switch.  Will discontinue gabapentin (pharmacy to be called about discontinuation).   Lyrica 75 mg twice daily ordered.  Drowsiness is most common side effect.  Doug reviewed and appropriate.  Patient has follow-up appointment on 12/15/2021 and controlled substance contract will be updated at that time. Also wishes to discuss weight gain that has continued despite healthy diet. May be related to decreased physical activity in the setting of cervical radiculopathy.     Addendum (12/01/2021): Lyrica 75 mg BID did not improve pain. Recommended trying two tablets, Lyrica 150 mg BID, and if pain remains intolerable, to make an appointment for evaluation. While it is probably best to have one physician managing pain, do not want her suffering.     Angela Collado MD  [facility]

## 2021-10-22 LAB
BACTERIA UR QL AUTO: ABNORMAL /HPF
HYALINE CASTS UR QL AUTO: ABNORMAL /LPF
RBC # UR: ABNORMAL /HPF
REF LAB TEST METHOD: ABNORMAL
SQUAMOUS #/AREA URNS HPF: ABNORMAL /HPF
WBC UR QL AUTO: ABNORMAL /HPF

## 2021-10-23 LAB — BACTERIA SPEC AEROBE CULT: NORMAL

## 2021-10-25 ENCOUNTER — LAB (OUTPATIENT)
Dept: LAB | Facility: HOSPITAL | Age: 61
End: 2021-10-25

## 2021-10-25 DIAGNOSIS — R31.9 HEMATURIA, UNSPECIFIED TYPE: ICD-10-CM

## 2021-10-25 DIAGNOSIS — R80.9 PROTEINURIA, UNSPECIFIED TYPE: ICD-10-CM

## 2021-10-25 LAB
ALBUMIN SERPL-MCNC: 5 G/DL (ref 3.5–5.2)
ALBUMIN/GLOB SERPL: 1.9 G/DL
ALP SERPL-CCNC: 123 U/L (ref 39–117)
ALT SERPL W P-5'-P-CCNC: 60 U/L (ref 1–33)
ANION GAP SERPL CALCULATED.3IONS-SCNC: 13.6 MMOL/L (ref 5–15)
AST SERPL-CCNC: 58 U/L (ref 1–32)
BASOPHILS # BLD AUTO: 0.03 10*3/MM3 (ref 0–0.2)
BASOPHILS NFR BLD AUTO: 0.4 % (ref 0–1.5)
BILIRUB SERPL-MCNC: 0.4 MG/DL (ref 0–1.2)
BUN SERPL-MCNC: 32 MG/DL (ref 8–23)
BUN/CREAT SERPL: 24.1 (ref 7–25)
CALCIUM SPEC-SCNC: 9.3 MG/DL (ref 8.6–10.5)
CHLORIDE SERPL-SCNC: 102 MMOL/L (ref 98–107)
CK SERPL-CCNC: 70 U/L (ref 20–180)
CO2 SERPL-SCNC: 21.4 MMOL/L (ref 22–29)
CREAT SERPL-MCNC: 1.33 MG/DL (ref 0.57–1)
DEPRECATED RDW RBC AUTO: 44.1 FL (ref 37–54)
EOSINOPHIL # BLD AUTO: 0.02 10*3/MM3 (ref 0–0.4)
EOSINOPHIL NFR BLD AUTO: 0.3 % (ref 0.3–6.2)
ERYTHROCYTE [DISTWIDTH] IN BLOOD BY AUTOMATED COUNT: 12.7 % (ref 12.3–15.4)
GFR SERPL CREATININE-BSD FRML MDRD: 41 ML/MIN/1.73
GLOBULIN UR ELPH-MCNC: 2.7 GM/DL
GLUCOSE SERPL-MCNC: 89 MG/DL (ref 65–99)
HCT VFR BLD AUTO: 37.2 % (ref 34–46.6)
HGB BLD-MCNC: 12.5 G/DL (ref 12–15.9)
IMM GRANULOCYTES # BLD AUTO: 0.03 10*3/MM3 (ref 0–0.05)
IMM GRANULOCYTES NFR BLD AUTO: 0.4 % (ref 0–0.5)
LYMPHOCYTES # BLD AUTO: 2.67 10*3/MM3 (ref 0.7–3.1)
LYMPHOCYTES NFR BLD AUTO: 33.7 % (ref 19.6–45.3)
MCH RBC QN AUTO: 32.5 PG (ref 26.6–33)
MCHC RBC AUTO-ENTMCNC: 33.6 G/DL (ref 31.5–35.7)
MCV RBC AUTO: 96.6 FL (ref 79–97)
MONOCYTES # BLD AUTO: 0.48 10*3/MM3 (ref 0.1–0.9)
MONOCYTES NFR BLD AUTO: 6.1 % (ref 5–12)
NEUTROPHILS NFR BLD AUTO: 4.7 10*3/MM3 (ref 1.7–7)
NEUTROPHILS NFR BLD AUTO: 59.1 % (ref 42.7–76)
NRBC BLD AUTO-RTO: 0.1 /100 WBC (ref 0–0.2)
PLATELET # BLD AUTO: 209 10*3/MM3 (ref 140–450)
PMV BLD AUTO: 11.5 FL (ref 6–12)
POTASSIUM SERPL-SCNC: 4.1 MMOL/L (ref 3.5–5.2)
PROT SERPL-MCNC: 7.7 G/DL (ref 6–8.5)
RBC # BLD AUTO: 3.85 10*6/MM3 (ref 3.77–5.28)
SODIUM SERPL-SCNC: 137 MMOL/L (ref 136–145)
WBC # BLD AUTO: 7.93 10*3/MM3 (ref 3.4–10.8)

## 2021-10-25 PROCEDURE — 82550 ASSAY OF CK (CPK): CPT

## 2021-10-25 PROCEDURE — 81001 URINALYSIS AUTO W/SCOPE: CPT

## 2021-10-25 PROCEDURE — 85025 COMPLETE CBC W/AUTO DIFF WBC: CPT

## 2021-10-25 PROCEDURE — 36415 COLL VENOUS BLD VENIPUNCTURE: CPT

## 2021-10-25 PROCEDURE — 80053 COMPREHEN METABOLIC PANEL: CPT

## 2021-10-26 ENCOUNTER — TELEPHONE (OUTPATIENT)
Dept: INTERNAL MEDICINE | Facility: CLINIC | Age: 61
End: 2021-10-26

## 2021-10-26 ENCOUNTER — LAB (OUTPATIENT)
Dept: LAB | Facility: HOSPITAL | Age: 61
End: 2021-10-26

## 2021-10-26 LAB
BACTERIA UR QL AUTO: NORMAL /HPF
BILIRUB UR QL STRIP: NEGATIVE
CLARITY UR: CLEAR
COLOR UR: YELLOW
GLUCOSE UR STRIP-MCNC: NEGATIVE MG/DL
HGB UR QL STRIP.AUTO: NEGATIVE
HYALINE CASTS UR QL AUTO: NORMAL /LPF
KETONES UR QL STRIP: NEGATIVE
LEUKOCYTE ESTERASE UR QL STRIP.AUTO: ABNORMAL
NITRITE UR QL STRIP: NEGATIVE
PH UR STRIP.AUTO: 6 [PH] (ref 5–8)
PROT UR QL STRIP: ABNORMAL
RBC # UR: NORMAL /HPF
REF LAB TEST METHOD: NORMAL
SP GR UR STRIP: 1.03 (ref 1–1.03)
SQUAMOUS #/AREA URNS HPF: NORMAL /HPF
UROBILINOGEN UR QL STRIP: ABNORMAL
WBC UR QL AUTO: NORMAL /HPF

## 2021-10-26 NOTE — TELEPHONE ENCOUNTER
Pt notified that referral has been authorized by insurance and sent to the nephrology office, however she will have to wait a phone call from their office. Pt verbalized understanding.

## 2021-10-26 NOTE — TELEPHONE ENCOUNTER
Caller: Radha White    Relationship: Self    Best call back number: [phone]    What is the best time to reach you: ANYTIME    Who are you requesting to speak with (clinical staff, provider,  specific staff member): REFERRAL COORDINATOR     What was the call regarding: PATIENT WOULD LIKE TO MAKE SURE SHE HASN'T MISSED A CALL FROM NEPHROLOGY. PLEASE ADVISE    Do you require a callback: YES

## 2021-10-27 ENCOUNTER — TELEPHONE (OUTPATIENT)
Dept: INTERNAL MEDICINE | Facility: CLINIC | Age: 61
End: 2021-10-27

## 2021-10-27 NOTE — TELEPHONE ENCOUNTER
PT HAS A SCHEDULED APPOINTMENT WITH [facility] [place], [place], 11-4-2021 AT 1:00 PM

## 2021-10-28 ENCOUNTER — LAB (OUTPATIENT)
Dept: LAB | Facility: HOSPITAL | Age: 61
End: 2021-10-28

## 2021-10-28 DIAGNOSIS — R80.9 PROTEINURIA, UNSPECIFIED TYPE: ICD-10-CM

## 2021-10-28 DIAGNOSIS — N17.9 AKI (ACUTE KIDNEY INJURY) (HCC): ICD-10-CM

## 2021-10-28 PROCEDURE — 84156 ASSAY OF PROTEIN URINE: CPT

## 2021-10-28 PROCEDURE — 81050 URINALYSIS VOLUME MEASURE: CPT

## 2021-10-29 ENCOUNTER — LAB (OUTPATIENT)
Dept: LAB | Facility: HOSPITAL | Age: 61
End: 2021-10-29

## 2021-10-29 ENCOUNTER — TELEPHONE (OUTPATIENT)
Dept: INTERNAL MEDICINE | Facility: CLINIC | Age: 61
End: 2021-10-29

## 2021-10-29 DIAGNOSIS — R80.9 PROTEINURIA, UNSPECIFIED TYPE: Primary | ICD-10-CM

## 2021-10-29 DIAGNOSIS — R63.5 WEIGHT GAIN: ICD-10-CM

## 2021-10-29 DIAGNOSIS — R80.9 PROTEINURIA, UNSPECIFIED TYPE: ICD-10-CM

## 2021-10-29 DIAGNOSIS — E03.9 ACQUIRED HYPOTHYROIDISM: Chronic | ICD-10-CM

## 2021-10-29 LAB
ANION GAP SERPL CALCULATED.3IONS-SCNC: 13.8 MMOL/L (ref 5–15)
BUN SERPL-MCNC: 36 MG/DL (ref 8–23)
BUN/CREAT SERPL: 30.5 (ref 7–25)
CALCIUM SPEC-SCNC: 9.5 MG/DL (ref 8.6–10.5)
CHLORIDE SERPL-SCNC: 100 MMOL/L (ref 98–107)
CO2 SERPL-SCNC: 22.2 MMOL/L (ref 22–29)
CREAT SERPL-MCNC: 1.18 MG/DL (ref 0.57–1)
GFR SERPL CREATININE-BSD FRML MDRD: 47 ML/MIN/1.73
GLUCOSE SERPL-MCNC: 155 MG/DL (ref 65–99)
POTASSIUM SERPL-SCNC: 4.6 MMOL/L (ref 3.5–5.2)
PROT 24H UR-MRATE: 225 MG/24HOURS (ref 0–150)
SODIUM SERPL-SCNC: 136 MMOL/L (ref 136–145)

## 2021-10-29 PROCEDURE — 36415 COLL VENOUS BLD VENIPUNCTURE: CPT

## 2021-10-29 PROCEDURE — 80048 BASIC METABOLIC PNL TOTAL CA: CPT

## 2021-10-29 PROCEDURE — 82533 TOTAL CORTISOL: CPT

## 2021-10-29 PROCEDURE — 84439 ASSAY OF FREE THYROXINE: CPT

## 2021-10-29 PROCEDURE — 84480 ASSAY TRIIODOTHYRONINE (T3): CPT

## 2021-10-29 PROCEDURE — 84443 ASSAY THYROID STIM HORMONE: CPT

## 2021-10-29 PROCEDURE — 82570 ASSAY OF URINE CREATININE: CPT

## 2021-10-29 PROCEDURE — 82043 UR ALBUMIN QUANTITATIVE: CPT

## 2021-10-29 NOTE — TELEPHONE ENCOUNTER
Please send orders for her kidney ultrasound to [facility].  She says she can get an appt. At 12:20 PM on 11/03/2021.  If that appt and order can be sent

## 2021-10-30 LAB
T3 SERPL-MCNC: 75.3 NG/DL (ref 80–200)
T4 FREE SERPL-MCNC: 1.57 NG/DL (ref 0.93–1.7)
TSH SERPL DL<=0.05 MIU/L-ACNC: 0.45 UIU/ML (ref 0.27–4.2)

## 2021-11-01 LAB
ALBUMIN UR-MCNC: 4.6 MG/DL
CREAT UR-MCNC: 87.4 MG/DL
MICROALBUMIN/CREAT UR: 52.6 MG/G

## 2021-11-03 ENCOUNTER — HOSPITAL ENCOUNTER (OUTPATIENT)
Dept: ULTRASOUND IMAGING | Facility: HOSPITAL | Age: 61
End: 2021-11-03

## 2021-11-05 DIAGNOSIS — M54.12 CERVICAL RADICULOPATHY: Primary | ICD-10-CM

## 2021-11-05 LAB
CORTIS SERPL-MCNC: <1 UG/DL
DEXAMETHASONE REFLEX: ABNORMAL

## 2021-11-18 PROBLEM — M47.812 CERVICAL SPONDYLOSIS WITHOUT MYELOPATHY: Status: ACTIVE | Noted: 2021-11-18

## 2021-11-18 PROBLEM — M50.30 DDD (DEGENERATIVE DISC DISEASE), CERVICAL: Status: ACTIVE | Noted: 2021-11-18

## 2021-11-18 PROBLEM — Z79.01 CHRONIC ANTICOAGULATION: Status: ACTIVE | Noted: 2021-11-18
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
## 2021-11-23 ENCOUNTER — APPOINTMENT (OUTPATIENT)
Dept: GENERAL RADIOLOGY | Facility: HOSPITAL | Age: 61
End: 2021-11-23

## 2021-11-23 ENCOUNTER — OFFICE VISIT (OUTPATIENT)
Dept: PAIN MEDICINE | Facility: CLINIC | Age: 61
End: 2021-11-23

## 2021-11-23 VITALS
HEART RATE: 76 BPM | SYSTOLIC BLOOD PRESSURE: 133 MMHG | DIASTOLIC BLOOD PRESSURE: 97 MMHG | HEIGHT: 63 IN | TEMPERATURE: 98.5 F | RESPIRATION RATE: 16 BRPM | WEIGHT: 130 LBS | OXYGEN SATURATION: 98 % | BODY MASS INDEX: 23.04 KG/M2

## 2021-11-23 DIAGNOSIS — M79.18 MYOFASCIAL PAIN: ICD-10-CM

## 2021-11-23 DIAGNOSIS — M50.120 CERVICAL DISC DISORDER WITH RADICULOPATHY OF MID-CERVICAL REGION: Primary | ICD-10-CM

## 2021-11-23 DIAGNOSIS — M50.120 CERVICAL DISC DISORDER WITH RADICULOPATHY OF MID-CERVICAL REGION: ICD-10-CM

## 2021-11-23 DIAGNOSIS — R51.9 OCCIPITAL HEADACHE: ICD-10-CM

## 2021-11-23 DIAGNOSIS — M48.02 CERVICAL SPINAL STENOSIS: ICD-10-CM

## 2021-11-23 DIAGNOSIS — Z98.61 CAD S/P PERCUTANEOUS CORONARY ANGIOPLASTY: ICD-10-CM

## 2021-11-23 DIAGNOSIS — M47.812 CERVICAL SPONDYLOSIS WITHOUT MYELOPATHY: ICD-10-CM

## 2021-11-23 DIAGNOSIS — Z79.01 CHRONIC ANTICOAGULATION: ICD-10-CM

## 2021-11-23 DIAGNOSIS — I25.10 CAD S/P PERCUTANEOUS CORONARY ANGIOPLASTY: ICD-10-CM

## 2021-11-23 DIAGNOSIS — M50.30 DDD (DEGENERATIVE DISC DISEASE), CERVICAL: ICD-10-CM

## 2021-11-23 PROCEDURE — 99204 OFFICE O/P NEW MOD 45 MIN: CPT | Performed by: ANESTHESIOLOGY

## 2021-11-23 RX ORDER — MULTIVITAMIN WITH IRON
100 TABLET ORAL DAILY
Qty: 30 TABLET | Refills: 0 | Status: SHIPPED | OUTPATIENT
Start: 2021-11-23 | End: 2021-11-23

## 2021-11-23 RX ORDER — TIZANIDINE 2 MG/1
2 TABLET ORAL EVERY 8 HOURS PRN
Qty: 90 TABLET | Refills: 1 | Status: SHIPPED | OUTPATIENT
Start: 2021-11-23 | End: 2022-01-24

## 2021-11-23 RX ORDER — NORTRIPTYLINE HYDROCHLORIDE 10 MG/1
10 CAPSULE ORAL NIGHTLY PRN
Qty: 60 CAPSULE | Refills: 1 | Status: SHIPPED | OUTPATIENT
Start: 2021-11-23 | End: 2022-01-24

## 2021-11-23 RX ORDER — MULTIVITAMIN WITH IRON
100 TABLET ORAL DAILY
Qty: 30 TABLET | Refills: 0 | Status: SHIPPED | OUTPATIENT
Start: 2021-11-23

## 2021-11-23 RX ORDER — NORTRIPTYLINE HYDROCHLORIDE 10 MG/1
10 CAPSULE ORAL NIGHTLY PRN
Qty: 60 CAPSULE | Refills: 1 | Status: SHIPPED | OUTPATIENT
Start: 2021-11-23 | End: 2021-11-23

## 2021-11-23 RX ORDER — TIZANIDINE 2 MG/1
2 TABLET ORAL EVERY 8 HOURS PRN
Qty: 90 TABLET | Refills: 1 | Status: SHIPPED | OUTPATIENT
Start: 2021-11-23 | End: 2021-11-23

## 2021-11-23 RX ORDER — ME-TETRAHYDROFOLATE/B12/HRB236 1-1-500 MG
1 CAPSULE ORAL 2 TIMES DAILY
Qty: 60 CAPSULE | Refills: 1 | Status: SHIPPED | OUTPATIENT
Start: 2021-11-23 | End: 2022-02-04 | Stop reason: SDUPTHER

## 2021-11-29 ENCOUNTER — TELEPHONE (OUTPATIENT)
Dept: PAIN MEDICINE | Facility: CLINIC | Age: 61
End: 2021-11-29

## 2021-11-29 NOTE — TELEPHONE ENCOUNTER
Spoke with pt regarding her [employer] message about her xray results. Pt stated that she had gotten them completed at [facility]. I advised that I would call and get the results for her. Pt also inquired if she could try going on Lyrica due to the gabapentin not working for the pt. I advised I would send a message to Dr. Puri for him to advise. I advised pt I would call her back.       Spoke with pt and advised that Dr. Puri advised that she talk to her primary about her gabapentin. Pt understood. Also advise that I would be calling [facility] again for her xray report. Pt understood.

## 2021-11-30 ENCOUNTER — TELEPHONE (OUTPATIENT)
Dept: INTERNAL MEDICINE | Facility: CLINIC | Age: 61
End: 2021-11-30

## 2021-11-30 DIAGNOSIS — M54.12 CERVICAL RADICULOPATHY: Primary | ICD-10-CM

## 2021-11-30 RX ORDER — PREGABALIN 75 MG/1
75 CAPSULE ORAL 2 TIMES DAILY
Qty: 60 CAPSULE | Refills: 0 | Status: SHIPPED | OUTPATIENT
Start: 2021-11-30 | End: 2022-01-24

## 2021-11-30 NOTE — TELEPHONE ENCOUNTER
----- Message from Angela Collado MD sent at 11/30/2021  8:10 AM EST -----  Please contact Radha's pharmacy and let them know that I have discontinued her Gabapentin prescription. She will be getting a prescription for a different but similar medication (Lyrica) and I want to be sure she does not get both.     KZ

## 2021-12-01 ENCOUNTER — TELEPHONE (OUTPATIENT)
Dept: PAIN MEDICINE | Facility: CLINIC | Age: 61
End: 2021-12-01

## 2021-12-01 NOTE — TELEPHONE ENCOUNTER
Received blood thinner clearance from Dr. Ervin Yang that it is okay for pt to stop her plavix 7 days prior to her procedure, but to continue her aspirin.     Spoke with pt and advised her of the clearance. Pt wanted to know more about her xray. I advised that she has osteoarthritis in her neck, and that the radiologist had recommended a MRI for further information but that she has already obtained that and didn't need to repeat another one. Pt also advised that her her PCP took her off of her gabapentin and prescribed her Lyrica to help with her pain, and if that doesn't work then she may have to be prescribed an opioid or narcotic until her procedure and pt stated that she wasn't interested in being on narcotics and expressed that she hoped the lyrica helps. Pt understood everything and expressed no further needs.

## 2021-12-08 DIAGNOSIS — M50.120 CERVICAL DISC DISORDER WITH RADICULOPATHY OF MID-CERVICAL REGION: Primary | ICD-10-CM

## 2021-12-10 ENCOUNTER — TRANSCRIBE ORDERS (OUTPATIENT)
Dept: LAB | Facility: HOSPITAL | Age: 61
End: 2021-12-10

## 2021-12-10 ENCOUNTER — LAB (OUTPATIENT)
Dept: LAB | Facility: HOSPITAL | Age: 61
End: 2021-12-10

## 2021-12-10 DIAGNOSIS — Z90.5 ACQUIRED ABSENCE OF KIDNEY: Primary | ICD-10-CM

## 2021-12-10 LAB
BILIRUB UR QL STRIP: NEGATIVE
CLARITY UR: CLEAR
COLOR UR: YELLOW
GLUCOSE UR STRIP-MCNC: NEGATIVE MG/DL
HGB UR QL STRIP.AUTO: NEGATIVE
KETONES UR QL STRIP: NEGATIVE
LEUKOCYTE ESTERASE UR QL STRIP.AUTO: NEGATIVE
NITRITE UR QL STRIP: NEGATIVE
PH UR STRIP.AUTO: 6.5 [PH] (ref 5–8)
PROT UR QL STRIP: NEGATIVE
SP GR UR STRIP: 1.01 (ref 1–1.03)
UROBILINOGEN UR QL STRIP: NORMAL

## 2021-12-10 PROCEDURE — 84155 ASSAY OF PROTEIN SERUM: CPT

## 2021-12-10 PROCEDURE — 86235 NUCLEAR ANTIGEN ANTIBODY: CPT

## 2021-12-10 PROCEDURE — 84166 PROTEIN E-PHORESIS/URINE/CSF: CPT

## 2021-12-10 PROCEDURE — 80048 BASIC METABOLIC PNL TOTAL CA: CPT

## 2021-12-10 PROCEDURE — 86038 ANTINUCLEAR ANTIBODIES: CPT

## 2021-12-10 PROCEDURE — 86225 DNA ANTIBODY NATIVE: CPT

## 2021-12-10 PROCEDURE — 36415 COLL VENOUS BLD VENIPUNCTURE: CPT

## 2021-12-10 PROCEDURE — 84156 ASSAY OF PROTEIN URINE: CPT

## 2021-12-10 PROCEDURE — 82570 ASSAY OF URINE CREATININE: CPT

## 2021-12-10 PROCEDURE — 84165 PROTEIN E-PHORESIS SERUM: CPT

## 2021-12-10 PROCEDURE — 81003 URINALYSIS AUTO W/O SCOPE: CPT

## 2021-12-11 LAB
ANION GAP SERPL CALCULATED.3IONS-SCNC: 12.3 MMOL/L (ref 5–15)
BUN SERPL-MCNC: 18 MG/DL (ref 8–23)
BUN/CREAT SERPL: 19.6 (ref 7–25)
CALCIUM SPEC-SCNC: 10 MG/DL (ref 8.6–10.5)
CHLORIDE SERPL-SCNC: 100 MMOL/L (ref 98–107)
CO2 SERPL-SCNC: 22.7 MMOL/L (ref 22–29)
CREAT SERPL-MCNC: 0.92 MG/DL (ref 0.57–1)
CREAT UR-MCNC: 49.4 MG/DL
GFR SERPL CREATININE-BSD FRML MDRD: 62 ML/MIN/1.73
GLUCOSE SERPL-MCNC: 88 MG/DL (ref 65–99)
POTASSIUM SERPL-SCNC: 4.1 MMOL/L (ref 3.5–5.2)
PROT ?TM UR-MCNC: 6 MG/DL
PROT/CREAT UR: 121.5 MG/G CREA (ref 0–200)
SODIUM SERPL-SCNC: 135 MMOL/L (ref 136–145)

## 2021-12-13 ENCOUNTER — OUTSIDE FACILITY SERVICE (OUTPATIENT)
Dept: PAIN MEDICINE | Facility: CLINIC | Age: 61
End: 2021-12-13

## 2021-12-13 LAB
ALBUMIN SERPL ELPH-MCNC: 4.2 G/DL (ref 2.9–4.4)
ALBUMIN/GLOB SERPL: 1.2 {RATIO} (ref 0.7–1.7)
ALPHA1 GLOB SERPL ELPH-MCNC: 0.3 G/DL (ref 0–0.4)
ALPHA2 GLOB SERPL ELPH-MCNC: 1 G/DL (ref 0.4–1)
B-GLOBULIN SERPL ELPH-MCNC: 1.2 G/DL (ref 0.7–1.3)
GAMMA GLOB SERPL ELPH-MCNC: 1 G/DL (ref 0.4–1.8)
GLOBULIN SER CALC-MCNC: 3.5 G/DL (ref 2.2–3.9)
LABORATORY COMMENT REPORT: NORMAL
M PROTEIN SERPL ELPH-MCNC: NORMAL G/DL
PROT PATTERN SERPL ELPH-IMP: NORMAL
PROT SERPL-MCNC: 7.7 G/DL (ref 6–8.5)

## 2021-12-13 PROCEDURE — 99152 MOD SED SAME PHYS/QHP 5/>YRS: CPT | Performed by: ANESTHESIOLOGY

## 2021-12-13 PROCEDURE — 20552 NJX 1/MLT TRIGGER POINT 1/2: CPT | Performed by: ANESTHESIOLOGY

## 2021-12-13 PROCEDURE — 62321 NJX INTERLAMINAR CRV/THRC: CPT | Performed by: ANESTHESIOLOGY

## 2021-12-14 ENCOUNTER — TELEPHONE (OUTPATIENT)
Dept: PAIN MEDICINE | Facility: CLINIC | Age: 61
End: 2021-12-14

## 2021-12-14 LAB
ANA HOMOGEN TITR SER: ABNORMAL {TITER}
ANA NUCLEOLAR TITR SER: ABNORMAL {TITER}
ANA TITR SER IF: POSITIVE {TITER}
CENTROMERE B AB SER-ACNC: <0.2 AI (ref 0–0.9)
CHROMATIN AB SERPL-ACNC: <0.2 AI (ref 0–0.9)
DSDNA AB SER-ACNC: <1 IU/ML (ref 0–9)
ENA JO1 AB SER-ACNC: <0.2 AI (ref 0–0.9)
ENA RNP AB SER-ACNC: <0.2 AI (ref 0–0.9)
ENA SCL70 AB SER-ACNC: <0.2 AI (ref 0–0.9)
ENA SM AB SER-ACNC: <0.2 AI (ref 0–0.9)
ENA SS-A AB SER-ACNC: <0.2 AI (ref 0–0.9)
ENA SS-B AB SER-ACNC: <0.2 AI (ref 0–0.9)
LABORATORY COMMENT REPORT: ABNORMAL
Lab: ABNORMAL
Lab: ABNORMAL

## 2021-12-14 RX ORDER — LEVOTHYROXINE SODIUM 175 UG/1
CAPSULE ORAL
Qty: 90 CAPSULE | Refills: 0 | Status: SHIPPED | OUTPATIENT
Start: 2021-12-14 | End: 2022-03-22 | Stop reason: SDUPTHER

## 2021-12-14 RX ORDER — LEVOTHYROXINE SODIUM 175 UG/1
1 CAPSULE ORAL DAILY
Qty: 90 CAPSULE | Refills: 1 | Status: CANCELLED | OUTPATIENT
Start: 2021-12-14

## 2021-12-14 NOTE — TELEPHONE ENCOUNTER
Spoke with pt regarding yesterdays procedure with Dr. Puri. Pt stated they were feeling good, with no complaints. Advised of f/u appointment. Pt. understood.

## 2021-12-14 NOTE — TELEPHONE ENCOUNTER
mychart rx refill request     Tirosent 175    From   Radha White To   [facility] Clinical Pool Sent   12/14/2021  7:34 AM   Would you please call in a refill to [facility] on [address] in [place]. I am picking up my prescriptions later this morning and only have a couple more of these.

## 2021-12-15 ENCOUNTER — OFFICE VISIT (OUTPATIENT)
Dept: INTERNAL MEDICINE | Facility: CLINIC | Age: 61
End: 2021-12-15

## 2021-12-15 VITALS
SYSTOLIC BLOOD PRESSURE: 124 MMHG | TEMPERATURE: 96.9 F | WEIGHT: 130.6 LBS | HEIGHT: 63 IN | DIASTOLIC BLOOD PRESSURE: 68 MMHG | HEART RATE: 71 BPM | RESPIRATION RATE: 16 BRPM | BODY MASS INDEX: 23.14 KG/M2 | OXYGEN SATURATION: 98 %

## 2021-12-15 DIAGNOSIS — Z90.5 SINGLE KIDNEY: ICD-10-CM

## 2021-12-15 DIAGNOSIS — M50.120 CERVICAL DISC DISORDER WITH RADICULOPATHY OF MID-CERVICAL REGION: Primary | ICD-10-CM

## 2021-12-15 DIAGNOSIS — E89.40 SURGICAL MENOPAUSE: ICD-10-CM

## 2021-12-15 DIAGNOSIS — E03.9 ACQUIRED HYPOTHYROIDISM: Chronic | ICD-10-CM

## 2021-12-15 DIAGNOSIS — R76.8 POSITIVE ANA (ANTINUCLEAR ANTIBODY): ICD-10-CM

## 2021-12-15 PROCEDURE — 99214 OFFICE O/P EST MOD 30 MIN: CPT | Performed by: STUDENT IN AN ORGANIZED HEALTH CARE EDUCATION/TRAINING PROGRAM

## 2021-12-15 RX ORDER — TRIAMCINOLONE
POWDER (GRAM) MISCELLANEOUS
COMMUNITY
Start: 2021-11-30 | End: 2022-08-31

## 2021-12-15 NOTE — PROGRESS NOTES
Internal Medicine Established Office Visit      Date: 12/15/2021     Patient Name: Radha White  : 1960   MRN: [id number]     Chief Complaint:    Chief Complaint   Patient presents with   • Follow-up     8 week, Radiculopathy of cervical spine   • Hot Flashes     every hour or two       History of Present Illness: Radha White is a 61 y.o. female who presents to clinic today for follow-up. She received injection for pain control several days ago and notes the first day after the pain was much improved but today she feels like she is having a little bit more pain. Pain is much more tolerable now though. She also notes frequent hot flashes. Estrogen was replacement was stopped several months ago. Hot flashes occur mostly at night and last around 3 minutes. She is wearing appropriate clothing and has a fan on to help mitigate the symptoms. She has a follow-up appointment with her nephrologist tomorrow who notes that her kidney is functioning appropriately.    Subjective     I have reviewed and the following portions of the patient's history were updated as appropriate: past family history, past medical history, past social history, past surgical history and problem list.     Medications:     Current Outpatient Medications:   •  aspirin (aspirin) 81 MG EC tablet, Take 1 capsule twice a day by oral route for 30 days., Disp: , Rfl:   •  ciclopirox (PENLAC) 8 % solution, ciclopirox 8 % topical solution  APPLY TO AND UNDER THE AFFECTED TOENAILS ONCE DAILY, Disp: , Rfl:   •  clopidogrel (PLAVIX) 75 MG tablet, clopidogrel 75 mg tablet, Disp: , Rfl:   •  Dietary Management Product (Rheumate) capsule, Take 1 capsule by mouth 2 (Two) Times a Day., Disp: 60 capsule, Rfl: 1  •  furosemide (LASIX) 40 MG tablet, furosemide 40 mg tablet, Disp: , Rfl:   •  Gel Base gel, lidocaine 10%, prilocaine 2%, mannitol 20%, capsaicin 0.001%, imipramine 3% cream, apply cream every 6-8 hours to affected areas as needed,  Disp: 240 g, Rfl: 5  •  labetalol (NORMODYNE) 300 MG tablet, labetalol 300 mg tablet  Take 1 tablet every day by oral route., Disp: , Rfl:   •  liothyronine (CYTOMEL) 5 MCG tablet, Take 1/2-1 tab daily as directed, Disp: 30 tablet, Rfl: 4  •  lisinopril (PRINIVIL,ZESTRIL) 10 MG tablet, lisinopril 10 mg tablet, Disp: , Rfl:   •  multivitamin with minerals tablet tablet, Take 1 tablet by mouth Daily., Disp: , Rfl:   •  potassium chloride (Klor-Con) 20 MEQ packet, Take 20 mEq by mouth As Needed., Disp: , Rfl:   •  Probiotic Product (Pro-biotic Blend) capsule, Take  by mouth., Disp: , Rfl:   •  rosuvastatin (CRESTOR) 40 MG tablet, , Disp: , Rfl:   •  Tirosint 175 MCG capsule, Take 1 capsule by mouth once daily, Disp: 90 capsule, Rfl: 0  •  vitamin B-6 (PYRIDOXINE) 100 MG tablet, Take 1 tablet by mouth Daily. Take for 30 days, Disp: 30 tablet, Rfl: 0  •  Alpha-Lipoic Acid 300 MG tablet, Take 1 capsule by mouth 3 (Three) Times a Day., Disp: 90 tablet, Rfl: 1  •  nortriptyline (PAMELOR) 10 MG capsule, Take 1 capsule by mouth At Night As Needed for Sleep. 1-2 TABLETS AT BEDTIME, Disp: 60 capsule, Rfl: 1  •  predniSONE (DELTASONE) 10 MG tablet, Take 1 tablet by mouth Daily. Take 60 mg for 5 days, 40 mg for 4 days, 20 mg for 3 days, 10 mg for 2 days, 5 mg (half tab) for 1 day., Disp: 55 tablet, Rfl: 0  •  pregabalin (Lyrica) 75 MG capsule, Take 1 capsule by mouth 2 (Two) Times a Day., Disp: 60 capsule, Rfl: 0  •  propylene glycol liquid, , Disp: , Rfl:   •  tiZANidine (ZANAFLEX) 2 MG tablet, Take 1 tablet by mouth Every 8 (Eight) Hours As Needed for Muscle Spasms. Start 1/2 tab QHS, then continue 1/2 tab to 1 tab TID PRN muscle spasms, Disp: 90 tablet, Rfl: 1    Allergies:   Allergies   Allergen Reactions   • Nsaids Unknown - High Severity       Objective     Physical Exam:   Vital Signs:   Vitals:    12/15/21 0942   BP: 124/68   Pulse: 71   Resp: 16   Temp: 96.9 °F (36.1 °C)   SpO2: 98%   Weight: 59.2 kg (130 lb 9.6 oz)  "  Height: 160 cm (62.99\")   PainSc: 0-No pain     Body mass index is 23.14 kg/m².     Physical Exam  Vitals and nursing note reviewed.   Constitutional:       Appearance: Normal appearance.   Cardiovascular:      Rate and Rhythm: Normal rate and regular rhythm.      Heart sounds: Normal heart sounds.   Pulmonary:      Effort: Pulmonary effort is normal.      Breath sounds: Normal breath sounds. No wheezing, rhonchi or rales.   Skin:     General: Skin is warm and dry.   Neurological:      Mental Status: She is alert.   Psychiatric:         Mood and Affect: Mood normal.         Behavior: Behavior normal.       Assessment / Plan      Assessment/Plan:   Diagnoses and all orders for this visit:    1. Cervical disc disorder with radiculopathy of mid-cervical region (Primary)  Following with pain management with first injection on 12/13/2021. Pain greatly improved with this. She is to start physical therapy soon. Continue following with pain management.    2. Surgical menopause  Discussed concern with estrogen replacement with her history of CAD requiring stents. We discussed other options that have shown some improvement in hot flashes in some patients though the data is not great including gabapentin which she has been on recently and has not been helping with hot flashes and SSRIs/SNRIs which patient is not interested in starting. Discussed behavioral measures such as lowering room temperature, using fans, dressing in layers of clothing that can be easily shed, and avoiding triggers (such as spicy foods and stressful situations), can help reduce the number of hot flashes.    3. Acquired hypothyroidism  Follows with endocrinology for this and has an upcoming appointment. Continue Tirosint 175 mcg daily.    4. Positive JACE (antinuclear antibody)  Unclear significance. 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 She does not have any other symptoms consistent with autoimmune " disorder including joint pain (other than pain related to cervical DDD with radiculopathy), skin rash, joint inflammation, joint stiffness in the morning, eye redness or pain, oral ulcerations. At this point I do not see any further work-up needed but will defer to nephrology since they ordered the tests. Patient will let me know if nephrology believes more work-up should be done at which point I believe referral to rheumatology would be the next step.    5. Single Kidney  Reviewed labs obtained by nephrology including urinalysis and BMP which were normal. Creatinine is normal and there is no protein in urine.  Also reviewed autoimmune work up with positive JACE homogenous 1:160. DS DNA and anti-smith were negative.     Follow Up:   Return in about 3 months (around 3/15/2022) for Recheck, 30 minute appt .    Time:  I spent approximately 25 minutes providing clinical care for this patient; including review of patient's chart and provider documentation, face to face time spent with patient in examination room (obtaining history, performing physical exam, discussing diagnosis and management options), placing orders, and completing patient documentation.     Addendum (12/16/2021): Radha followed up with Nephrology today who would like her to be evaluated by a rheumatologist. Relevant labs have been obtained. Will refer to Dr. Colón.      Angela Collado MD  [facility]

## 2021-12-17 LAB
ALBUMIN MFR UR ELPH: 37.8 %
ALPHA1 GLOB MFR UR ELPH: 6.1 %
ALPHA2 GLOB MFR UR ELPH: 21.4 %
B-GLOBULIN MFR UR ELPH: 22.3 %
GAMMA GLOB MFR UR ELPH: 12.5 %
LABORATORY COMMENT REPORT: NORMAL
M PROTEIN MFR UR ELPH: NORMAL %
PROT UR-MCNC: 9.5 MG/DL

## 2022-01-21 ENCOUNTER — HOSPITAL ENCOUNTER (OUTPATIENT)
Dept: NEUROLOGY | Facility: HOSPITAL | Age: 62
Discharge: HOME | End: 2022-01-21
Admitting: NURSE PRACTITIONER

## 2022-01-21 DIAGNOSIS — M50.120 CERVICAL DISC DISORDER WITH RADICULOPATHY OF MID-CERVICAL REGION: ICD-10-CM

## 2022-01-21 PROCEDURE — 95910 NRV CNDJ TEST 7-8 STUDIES: CPT

## 2022-01-21 PROCEDURE — 95886 MUSC TEST DONE W/N TEST COMP: CPT

## 2022-01-24 ENCOUNTER — OFFICE VISIT (OUTPATIENT)
Dept: ENDOCRINOLOGY | Facility: CLINIC | Age: 62
End: 2022-01-24

## 2022-01-24 ENCOUNTER — LAB (OUTPATIENT)
Dept: LAB | Facility: HOSPITAL | Age: 62
End: 2022-01-24

## 2022-01-24 VITALS
OXYGEN SATURATION: 96 % | BODY MASS INDEX: 23.92 KG/M2 | SYSTOLIC BLOOD PRESSURE: 130 MMHG | DIASTOLIC BLOOD PRESSURE: 82 MMHG | WEIGHT: 135 LBS | HEIGHT: 63 IN | HEART RATE: 74 BPM

## 2022-01-24 DIAGNOSIS — R63.5 WEIGHT GAIN: Chronic | ICD-10-CM

## 2022-01-24 DIAGNOSIS — E03.9 ACQUIRED HYPOTHYROIDISM: Primary | Chronic | ICD-10-CM

## 2022-01-24 LAB
T3 SERPL-MCNC: 103 NG/DL (ref 80–200)
T4 FREE SERPL-MCNC: 1.43 NG/DL (ref 0.93–1.7)
TSH SERPL DL<=0.05 MIU/L-ACNC: 4.61 UIU/ML (ref 0.27–4.2)

## 2022-01-24 PROCEDURE — 84439 ASSAY OF FREE THYROXINE: CPT | Performed by: PHYSICIAN ASSISTANT

## 2022-01-24 PROCEDURE — 84480 ASSAY TRIIODOTHYRONINE (T3): CPT | Performed by: PHYSICIAN ASSISTANT

## 2022-01-24 PROCEDURE — 84443 ASSAY THYROID STIM HORMONE: CPT | Performed by: PHYSICIAN ASSISTANT

## 2022-01-24 PROCEDURE — 99214 OFFICE O/P EST MOD 30 MIN: CPT | Performed by: PHYSICIAN ASSISTANT

## 2022-01-24 RX ORDER — SEMAGLUTIDE 0.25 MG/.5ML
0.25 INJECTION, SOLUTION SUBCUTANEOUS WEEKLY
Qty: 2 ML | Refills: 0 | Status: SHIPPED | OUTPATIENT
Start: 2022-01-24 | End: 2022-03-01

## 2022-01-24 NOTE — PROGRESS NOTES
Chief Complaint:   Radha White is a 61 y.o. female who is being seen today for  Hypothyroidism .    HPI     61 y.o. female with CAD s/p remote stents followed by Dr. Demetri Yang, hypertension, hyperlipidemia, hx of nephrectomy 1965, complete hysterectomy 1996, following up on hypothyroidism today.   10/2020- we changed Synthroid 337 to Tirosint 275 due to concern for malabsorption with such a high dose.    1/2020- repeat labs showed undetectable TSH with high FT4. We decreased Tirosint to 275 mcg daily and have continued decreasing the dose gradually since. She is currently on 175 mcg daily.     Pt's main concern is inability to lose weight no matter what she eats. Has gained about 5 pounds since last visit. Is very frustrated and emotional regarding this issue.   T3 added last visit. Slightly more energy. On Cytomel 5 mcg daily.    Is off HRT. Is now having hot flashes.     Of note, cortisol was drawn 10/29/21 from an old order meant for dex suppression test. Pt was having repeat thyroid labs and the cortisol was drawn with those labs by accident, therefore pt did not take dexamethasone the night before. However, pt was on prednisone at the time, which did suppress the cortisol.      The following portions of the patient's history were reviewed and updated by me as appropriate: allergies, current medications, past family history, past social history, past surgical history and problem list.      Review of Systems  Review of Systems   Constitutional:        Unable to lose weight   Endocrine:        Hot flashes   All other systems reviewed and are negative.       Current medications:  Current Outpatient Medications   Medication Sig Dispense Refill   • aspirin (aspirin) 81 MG EC tablet Take 1 capsule twice a day by oral route for 30 days.     • clopidogrel (PLAVIX) 75 MG tablet clopidogrel 75 mg tablet     • Dietary Management Product (Rheumate) capsule Take 1 capsule by mouth 2 (Two) Times a Day. 60  capsule 1   • furosemide (LASIX) 40 MG tablet furosemide 40 mg tablet     • Gel Base gel lidocaine 10%, prilocaine 2%, mannitol 20%, capsaicin 0.001%, imipramine 3% cream, apply cream every 6-8 hours to affected areas as needed 240 g 5   • labetalol (NORMODYNE) 300 MG tablet labetalol 300 mg tablet   Take 1 tablet every day by oral route.     • liothyronine (CYTOMEL) 5 MCG tablet Take 1/2-1 tab daily as directed 30 tablet 4   • lisinopril (PRINIVIL,ZESTRIL) 10 MG tablet lisinopril 10 mg tablet     • multivitamin with minerals tablet tablet Take 1 tablet by mouth Daily.     • potassium chloride (Klor-Con) 20 MEQ packet Take 20 mEq by mouth As Needed.     • Probiotic Product (Pro-biotic Blend) capsule Take  by mouth.     • propylene glycol liquid      • rosuvastatin (CRESTOR) 40 MG tablet      • Semaglutide-Weight Management (Wegovy) 0.25 MG/0.5ML solution auto-injector Inject 0.25 mg under the skin into the appropriate area as directed 1 (One) Time Per Week. 2 mL 0   • Tirosint 175 MCG capsule Take 1 capsule by mouth once daily 90 capsule 0   • vitamin B-6 (PYRIDOXINE) 100 MG tablet Take 1 tablet by mouth Daily. Take for 30 days 30 tablet 0     No current facility-administered medications for this visit.       Physical Exam   Vitals:    01/24/22 1622   BP: 130/82   Pulse: 74   SpO2: 96%   Body mass index is 23.91 kg/m².  Physical Exam  Constitutional:       General: She is not in acute distress.     Appearance: She is well-developed. She is not diaphoretic.   HENT:      Head: Normocephalic.      Right Ear: External ear normal.      Left Ear: External ear normal.      Mouth/Throat:      Pharynx: No oropharyngeal exudate.   Eyes:      General: Lids are normal.         Right eye: No discharge.         Left eye: No discharge.      Conjunctiva/sclera: Conjunctivae normal.      Pupils:      Right eye: Pupil is reactive.      Left eye: Pupil is reactive.   Neck:      Thyroid: No thyroid mass or thyromegaly.      Vascular:  No JVD.      Trachea: No tracheal deviation.   Cardiovascular:      Rate and Rhythm: Normal rate and regular rhythm.      Heart sounds: Normal heart sounds. No murmur heard.      Pulmonary:      Effort: Pulmonary effort is normal. No respiratory distress.      Breath sounds: Normal breath sounds. No wheezing.   Abdominal:      General: Bowel sounds are normal.      Palpations: Abdomen is soft.      Tenderness: There is no abdominal tenderness.   Musculoskeletal:         General: No tenderness.   Lymphadenopathy:      Cervical: No cervical adenopathy.   Skin:     General: Skin is warm and dry.      Findings: No erythema or rash.   Neurological:      Mental Status: She is alert and oriented to person, place, and time.      Comments: No hand tremor bilaterally   Psychiatric:         Mood and Affect: Mood normal.         Speech: Speech normal.         Behavior: Behavior normal.         Thought Content: Thought content normal.         Judgment: Judgment normal.           Labs and Imaging   Lab Results   Component Value Date    TSH 0.454 10/29/2021    B8VIUVS 75.3 (L) 10/29/2021    THYROIDAB 19 10/13/2020         Assessment / Plan     Diagnoses and all orders for this visit:    1. Acquired hypothyroidism (Primary)  -     TSH  -     T4, Free  -     T3  -     Semaglutide-Weight Management (Wegovy) 0.25 MG/0.5ML solution auto-injector; Inject 0.25 mg under the skin into the appropriate area as directed 1 (One) Time Per Week.  Dispense: 2 mL; Refill: 0    2. Weight gain        Problem List Items Addressed This Visit        Other    Acquired hypothyroidism - Primary (Chronic)    Relevant Medications    Semaglutide-Weight Management (Wegovy) 0.25 MG/0.5ML solution auto-injector    Other Relevant Orders    TSH    T4, Free    T3      Other Visit Diagnoses     Weight gain  (Chronic)           Diagnosis was discussed and reviewed with the patient including the advantages of drug therapy.        1. Patient appears clinically  euthyroid. Repeat labs. Continue Tirosint 175 mcg daily plus cytomel 5 mcg daily unless labs dictate otherwise. Discussed weight gain unlikely related to thyroid. She would like to try GLP-1 agonist for weight loss. Discussed this is off label use with her current BMI. Risks reviewed regarding pancreatitis. No personal of fam hx of MTC or MEN. Possible side effects reviewed. Pt verbalized understanding and agrees to try Wegovy. 0.25 mg weekly sent. She will let me know if tolerating for higher dose to be sent.   2. Patient will return to our office in 3 months.   3. The risks and benefits of my recommendations, as well as other treatment options were discussed with the patient today. Questions were answered.    Urbano Helton PA-C

## 2022-01-25 DIAGNOSIS — E03.9 ACQUIRED HYPOTHYROIDISM: Primary | ICD-10-CM

## 2022-01-25 NOTE — PROGRESS NOTES
"Chief Complaint: \"Neck and right arm pain.\"        History of Present Illness:   Patient: Ms. Radha White, 61 y.o. female originally referred by Dr. Angela Collado in consultation for chronic intractable neck and shoulder pain. Patient reports a 5-month history of neck and shoulder pain. She reports her shoulder pain began after she was running after a large umbrella that was blown away and she was trying to control the umbrella.  She reports pain that begins in her cervical region and radiates into primarily the left shoulder but she will also experience some into the right.  She was last seen on December 13, 2021, when she underwent a cervical epidural steroid injection, combined with a trigger point injection at the left levator scapula and left trapezius, from which she tells me has provided her with about 80% pain relief lasting almost one week.  Her symptoms are gradually recurring, additionally she is beginning to feel more pain and numbness in her right arm, which is consistent with her recent electrodiagnostic studies which revealed a right C6-C7 radiculopathy.  She did begin physical therapy.  She returns today for post procedure follow-up and evaluation.  Pain Description: Constant neck pain with intermittent exacerbation, described as sharp, dull, aching, throbbing, and shooting sensation.   Radiation of Pain: The pain radiates from the neck down into the lateral aspect of the shoulder, and into the bilateral arms, extending into her left forearm, and into her right triceps.    Pain intensity today: 7/10  Average pain intensity last week: 6/10  Pain intensity ranges from: 7/10 to 9/10  Aggravating factors: Pain increases with extension, rotation of the cervical spine   Alleviating factors: Pain decreases with sitting with some neck support  Associated Symptoms:   Patient reports pain, numbness, and weakness in the upper extremities.  Patient denies any new bladder or bowel problems.   Patient " denies difficulties with her balance or recent falls.   Patient reports daily bilateral cervicogenic and occipital headaches lasting several hours.    Review of previous therapies and additional medical records:  Radha White has already failed the following measures, including:   Conservative Measures: Oral analgesics, topical analgesics, ice, heat, physical therapy   Interventional Measures: 12/13/2021: TIM combined with a trigger point injection at the left levator scapula and left trapezius  Surgical Measures: No history of previous cervical spine or shoulder surgery. Hx lumbar surgery in 1996 (Dr. De La Rosa and Dr Landry)   Radha White has not undergone orthopedic spine or neurosurgical consultation for chronic neck pain condition   Radha White presents with significant comorbidities including hypothyroidism, hyperlipidemia, gout, GERD, hypertension, CAD s/p angioplasty, on Plavix and aspirin  In terms of current analgesics, Radha White takes: Gabapentin  I have reviewed Doug Report is consistent with medication reconciliation.  SOAPP: Not completed by the patient    Global Pain Scale 11-23 2021 01-26 2022         Pain 24 15         Feelings 17 8         Clinical outcomes 22 8         Activities 24 4         GPS Total: 87 35           Review of New Diagnostic Studies:  Cervical spine x-rays with flexion-extension views 11/24/2021: Per radiology report minimal anterolisthesis of C2 on C3, grade 1 anterolisthesis of C3 on C4 and a grade 2 anterolisthesis of C4 on C5.  On extension views there is some instability.  Advanced degenerative changes throughout the cervical spine with disc space height loss and endplate osteophyte formation.  EMG/NCV of the bilateral upper extremities 1/21/2022: Right chronic C6-C7 radiculopathy, with mild to moderate ulnar neuropathy on the right, and mild ulnar neuropathy on the left.    Review of Diagnostic Studies:   MRI of the cervical spine  without contrast on 10/15/2021: Imaging was reviewed.  Diffuse mid cervical spine degenerative changes from C4-C5 through C6-C7.  Grade 1 anterior subluxation of C4 on C5.  Vertebral body heights are maintained.  The cervical cord maintains normal caliber and signal.    C2-C3, C3-C4, C4-C5: No significant canal or foraminal stenosis  C5-C6: Degenerative disc disease. No CSF seen anterior to the cord. Mild canal stenosis. Moderate lateral recess and left foraminal stenosis  C6-C7: Right-costa broad-based disc bulge. Mild canal stenosis. No significant foraminal stenosis  C7-T1: No significant canal or foraminal stenosis  Left shoulder x-rays on 10/14/2021: Mild degenerative changes  X-rays of the cervical spine on 10/14/2021: Vertebral body heights are maintained.  The dens is intact.  Advanced spondylosis with mild anterolisthesis of C3 on C4 and moderate anterolisthesis of C4 on C5.  Loss of disc space height with disc osteophyte complex formation and facet arthropathy most significant at C4-C5 and C5-C6.     Review of Systems   Constitutional: Positive for activity change, diaphoresis and unexpected weight change.   HENT: Positive for facial swelling, nosebleeds and sinus pressure.    Gastrointestinal: Positive for constipation.   Genitourinary: Positive for frequency.   Musculoskeletal: Positive for arthralgias, myalgias, neck pain and neck stiffness.   Neurological: Positive for weakness, numbness and headaches.   Psychiatric/Behavioral: Positive for sleep disturbance.   All other systems reviewed and are negative.        Patient Active Problem List   Diagnosis   • Acquired hypothyroidism   • Mixed hyperlipidemia   • Hypertensive disorder   • CAD S/P percutaneous coronary angioplasty   • Surgical menopause   • Liver damage   • Cervical spondylosis without myelopathy   • DDD (degenerative disc disease), cervical   • Chronic anticoagulation   • Occipital headache   • Myofascial pain   • Cervical spinal stenosis   •  Cervical disc disorder with radiculopathy of mid-cervical region   • Single kidney       Past Medical History:   Diagnosis Date   • Acid reflux    • Arthritis    • Asthma    • Gout    • Heart disease    • High cholesterol    • Hypertension    • Hypertension    • Hypothyroidism       Past Surgical History:   Procedure Laterality Date   • APPENDECTOMY     • BACK SURGERY     • CARDIAC SURGERY      2 stints   • HYSTERECTOMY     • LIVER BIOPSY      x 2   • NEPHRECTOMY           Family History   Problem Relation Age of Onset   • Arthritis Mother    • Gout Mother    • Diabetes Father    • Heart attack Father    • Hypertension Father    • Heart disease Father    • Hypertension Brother    • Hypertension Son    • Arthritis Maternal Grandmother    • Gout Maternal Grandmother          Social History     Socioeconomic History   • Marital status:    Tobacco Use   • Smoking status: Never Smoker   • Smokeless tobacco: Never Used   • Tobacco comment: some in high school    Vaping Use   • Vaping Use: Never used   Substance and Sexual Activity   • Alcohol use: Yes     Alcohol/week: 2.0 - 4.0 standard drinks     Types: 2 - 4 Glasses of wine per week     Comment: Occasionally    • Drug use: Defer   • Sexual activity: Defer           Current Outpatient Medications:   •  aspirin (aspirin) 81 MG EC tablet, Take 1 capsule twice a day by oral route for 30 days., Disp: , Rfl:   •  clopidogrel (PLAVIX) 75 MG tablet, clopidogrel 75 mg tablet, Disp: , Rfl:   •  Dietary Management Product (Rheumate) capsule, Take 1 capsule by mouth 2 (Two) Times a Day., Disp: 60 capsule, Rfl: 1  •  furosemide (LASIX) 40 MG tablet, furosemide 40 mg tablet, Disp: , Rfl:   •  Gel Base gel, lidocaine 10%, prilocaine 2%, mannitol 20%, capsaicin 0.001%, imipramine 3% cream, apply cream every 6-8 hours to affected areas as needed, Disp: 240 g, Rfl: 5  •  labetalol (NORMODYNE) 300 MG tablet, labetalol 300 mg tablet  Take 1 tablet every day by oral route., Disp: ,  "Rfl:   •  liothyronine (CYTOMEL) 5 MCG tablet, Take 1/2-1 tab daily as directed, Disp: 30 tablet, Rfl: 4  •  lisinopril (PRINIVIL,ZESTRIL) 10 MG tablet, lisinopril 10 mg tablet, Disp: , Rfl:   •  multivitamin with minerals tablet tablet, Take 1 tablet by mouth Daily., Disp: , Rfl:   •  potassium chloride (Klor-Con) 20 MEQ packet, Take 20 mEq by mouth As Needed., Disp: , Rfl:   •  Probiotic Product (Pro-biotic Blend) capsule, Take  by mouth., Disp: , Rfl:   •  propylene glycol liquid, , Disp: , Rfl:   •  rosuvastatin (CRESTOR) 40 MG tablet, , Disp: , Rfl:   •  Tirosint 175 MCG capsule, Take 1 capsule by mouth once daily, Disp: 90 capsule, Rfl: 0  •  vitamin B-6 (PYRIDOXINE) 100 MG tablet, Take 1 tablet by mouth Daily. Take for 30 days, Disp: 30 tablet, Rfl: 0  •  Semaglutide-Weight Management (Wegovy) 0.25 MG/0.5ML solution auto-injector, Inject 0.25 mg under the skin into the appropriate area as directed 1 (One) Time Per Week., Disp: 2 mL, Rfl: 0  •  tiZANidine (ZANAFLEX) 2 MG tablet, Take 1 tablet by mouth Every 8 (Eight) Hours As Needed for Muscle Spasms. Start 1/2 tab QHS, then continue 1/2 tab to 1 tab TID PRN muscle spasms, Disp: 90 tablet, Rfl: 0      Allergies   Allergen Reactions   • Nsaids Unknown - High Severity         /94   Pulse 66   Temp 96.9 °F (36.1 °C)   Ht 160 cm (63\")   Wt 60.9 kg (134 lb 3.2 oz)   SpO2 98%   BMI 23.77 kg/m²       Physical Exam:  Constitutional: Patient appears well-developed, well-nourished, well-hydrated, appears younger than stated age  HEENT: Head: Normocephalic and atraumatic  Eyes: Conjunctivae and lids are normal  Pupils: Equal, round, reactive to light  Neck: Trachea normal. Neck supple. No JVD present.   Lymphatic: No cervical adenopathy  Musculoskeletal   Gait and station: Gait evaluation demonstrated a normal gait.    Cervical spine: Passive and active range of motion are limited secondary to pain. Extension, lateral flexion, rotation of the cervical spine " increased and reproduced pain. Cervical facet joint loading maneuvers are positive.  Muscles: Presence of active trigger points at the left levator scapulae, left trapezius   Shoulders: The range of motion of the glenohumeral joints is full and without pain. Rotator cuff strength is 5/5.   Neurological:   Patient is alert and oriented to person, place, and time.   Speech: Normal.   Cortical function: Normal mental status.   Reflex Scores:  Right brachioradialis: 2+  Left brachioradialis: 2+  Right biceps: 2+  Left biceps: 2+  Right triceps: 2+  Left triceps: 2+  Right patellar: 0+  Left patellar: 2+  Right Achilles: 0+  Left Achilles: 0+  Motor strength: 5/5  Motor Tone: Normal  Involuntary movements: None.   Superficial/Primitive Reflexes: Primitive reflexes were absent.   Right Gtz: Absent  Left Gtz: Absent  Right ankle clonus: Absent  Left ankle clonus: Absent   Babinsky: Absent  Spurling sign: Positive. Neck tornado test: Positive. Lhermitte sign: Negative. Long tract signs: Negative.   Sensory exam: Intact to light touch, intact pain and temperature sensation, intact vibration sensation and normal proprioception.   Coordination: Normal finger to nose and heel to shin. Normal balance and negative Romberg's sign   Skin and subcutaneous tissue: Skin is warm and intact. No rash noted. No cyanosis.   Psychiatric: Judgment and insight: Normal. Recent and remote memory: Intact. Mood and affect: Normal.     I have reviewed the physical exam dated 11/23/2021. Upon examination of the patient today, there are no changes noted.      ASSESSMENT:   1. Cervical disc disorder with radiculopathy of mid-cervical region    2. Cervical spinal stenosis    3. DDD (degenerative disc disease), cervical    4. Cervical spondylosis without myelopathy    5. Occipital headache    6. Myofascial pain    7. Chronic anticoagulation        PLAN/MEDICAL DECISION MAKING:  Patient reports a 5-month history of neck, shoulder pain, and left  upper extremity pain.  Cervical x-rays revealed cervical spondylosis with anterolisthesis of C3 and C4 and C4 on C5. MRI of the cervical spine revealed severe degenerative disc disease in the mid cervical region with canal and neuroforaminal stenosis at C4-C5, C5-C6, and C6-C7 without evidence high-grade stenosis. X-rays of the shoulder were negative.  Electrodiagnostic studies revealed chronic right C6-C7 radiculopathy, normal reading on the left arm, there is mild to moderate right ulnar neuropathy with mild left ulnar neuropathy.  Unfortunately, she is continuing to struggle with symptoms, consistent with cervical radiculopathy.  She is now having more pain and numbness in her right arm, which is consistent with electrodiagnostic findings.  She does feel some small improvements with physical therapy, I have discussed with her further recommendations, such as repeat procedure, utilizing a different approach with cervical injections, and at this point she would like to continue with therapy for several more weeks to see how she responds, which I feel is reasonable.  She is going to keep me updated on her progress.  Patient has failed to obtain pain relief with conservative measures including oral analgesics, physical therapy, to name a few.  I have reviewed all available patient's medical records as well as previous therapies as referenced above. I had a lengthy conversation with Ms. Radha Jimenezglia regarding her chronic pain condition and potential therapeutic options including risks, benefits, alternative therapies, to name a few. Therefore, I have proposed the following plan:  1. Pharmacological measures: Reviewed and discussed;   A. Patient takes gabapentin 300 mg 3 times daily.    B. Continue nortriptyline 10 mg 1-2 tablets at bedtime  C. Continue tizanidine 2 mg 1/2 to 1 tablet 3 times a day as needed muscle spasm  D. Continue Rheumate one tablet twice daily  E. Continue alpha lipoid acid 2712-8186 mg  per day divided into 3 doses  G. Continue with lidocaine 10%, prilocaine 2%, mannitol 20%, capsaicin 0.001%, imipramine 3% cream, apply cream every 6-8 hours to affected areas as needed   3. Interventional pain management measures: None indicated at this time.  Patient is going to contact me after she has completed physical therapy, in the case of repeat procedure patient will need to stop clopidogrel (Plavix) at least 7 days between last dose and procedure to proceed with cervical epidural steroid injection by left versus right paramedian interlaminar approach. We may repeat epidural depending on patient's outcome or consider a diagnostic and therapeutic transforaminal approach on the left at C5-C6 and C6-C7 versus right C6-C7 depending on patient's outcome.  If patient continues to struggle with pain, then, will consider cervical myelogram followed by CT postmyelogram and/or referral to neurosurgery.  In addition, patient presents with mechanical neck pain, from which we could consider diagnostic bilateral cervical medial branch blocks at C3, C4, C5; for bilateral cervical facet joints at C3-C4, C4-C5, to clarify the origin of chronic refractory mechanical/axial cervicalgia. If patient experiences more than 80% pain relief along with significant improvement in the range of motion of the cervical spine, then, patient will be scheduled for a second set of diagnostic bilateral cervical medial branch blocks, to then, proceed with bilateral cervical medial branch rhizotomies.  Regarding her occipital headaches, if they fail to respond to conservative measures, then, we could consider diagnostic and therapeutic bilateral greater occipital nerve blocks by hydrodissection technique under ultrasound and fluoroscopic guidance.  4. Long-term rehabilitation efforts:  A. The patient does not have a history of falls. I did complete a risk assessment for falls  B. Patient will continue a comprehensive physical therapy program  for upper body strengthening/posture correction, gait and balance training, neurodynamics, ultrasound, myofascial release, cupping and dry needling   C.  Continue use of TENS unit/interferential unit  D. Contrast therapy: Apply ice-packs for 15-20 minutes, followed by heating pads for 15-20 minutes to affected area   5. The patient has been instructed to contact my office with any questions or difficulties. The patient understands the plan and agrees to proceed accordingly.      Patient Care Team:  Angela Collado MD as PCP - General (Family Medicine)  Chago Sharp MD (Gastroenterology)  Wendi Grimes APRN as Nurse Practitioner (Rheumatology)  Keith Yang MD as Consulting Physician (Cardiac Electrophysiology)  Pablo Saenz DO (Dermatology)  Urbano Helton PA-C as Physician Assistant (Endocrinology)  Roselia Campo APRN as Nurse Practitioner (Nurse Practitioner)     New Medications Ordered This Visit   Medications   • tiZANidine (ZANAFLEX) 2 MG tablet     Sig: Take 1 tablet by mouth Every 8 (Eight) Hours As Needed for Muscle Spasms. Start 1/2 tab QHS, then continue 1/2 tab to 1 tab TID PRN muscle spasms     Dispense:  90 tablet     Refill:  0         Future Appointments   Date Time Provider Department Center   3/17/2022  9:15 AM Angela Collado MD [facility]     Please note that portions of this note were completed with a voice recognition program.

## 2022-01-26 ENCOUNTER — OFFICE VISIT (OUTPATIENT)
Dept: PAIN MEDICINE | Facility: CLINIC | Age: 62
End: 2022-01-26

## 2022-01-26 VITALS
OXYGEN SATURATION: 98 % | SYSTOLIC BLOOD PRESSURE: 135 MMHG | HEIGHT: 63 IN | DIASTOLIC BLOOD PRESSURE: 94 MMHG | WEIGHT: 134.2 LBS | HEART RATE: 66 BPM | TEMPERATURE: 96.9 F | BODY MASS INDEX: 23.78 KG/M2

## 2022-01-26 DIAGNOSIS — M48.02 CERVICAL SPINAL STENOSIS: ICD-10-CM

## 2022-01-26 DIAGNOSIS — Z79.01 CHRONIC ANTICOAGULATION: ICD-10-CM

## 2022-01-26 DIAGNOSIS — R51.9 OCCIPITAL HEADACHE: ICD-10-CM

## 2022-01-26 DIAGNOSIS — M50.30 DDD (DEGENERATIVE DISC DISEASE), CERVICAL: ICD-10-CM

## 2022-01-26 DIAGNOSIS — M50.120 CERVICAL DISC DISORDER WITH RADICULOPATHY OF MID-CERVICAL REGION: ICD-10-CM

## 2022-01-26 DIAGNOSIS — M47.812 CERVICAL SPONDYLOSIS WITHOUT MYELOPATHY: ICD-10-CM

## 2022-01-26 DIAGNOSIS — M79.18 MYOFASCIAL PAIN: ICD-10-CM

## 2022-01-26 PROCEDURE — 99213 OFFICE O/P EST LOW 20 MIN: CPT | Performed by: NURSE PRACTITIONER

## 2022-01-26 RX ORDER — TIZANIDINE 2 MG/1
2 TABLET ORAL EVERY 8 HOURS PRN
Qty: 90 TABLET | Refills: 0 | Status: SHIPPED | OUTPATIENT
Start: 2022-01-26 | End: 2022-03-22

## 2022-01-27 DIAGNOSIS — M50.120 CERVICAL DISC DISORDER WITH RADICULOPATHY OF MID-CERVICAL REGION: Primary | ICD-10-CM

## 2022-01-31 DIAGNOSIS — M50.120 CERVICAL DISC DISORDER WITH RADICULOPATHY OF MID-CERVICAL REGION: Primary | ICD-10-CM

## 2022-02-01 ENCOUNTER — TELEPHONE (OUTPATIENT)
Dept: PAIN MEDICINE | Facility: CLINIC | Age: 62
End: 2022-02-01

## 2022-02-01 NOTE — TELEPHONE ENCOUNTER
Spoke with pt regarding her procedure and advised that we have her scheduled and that she needs to hold her plavix 7 days prior to the procedure and nothing to eat or drink after midnight, need a , and over in the surgery center. Pt understood, not further needs expressed.

## 2022-02-02 ENCOUNTER — TELEPHONE (OUTPATIENT)
Dept: PAIN MEDICINE | Facility: CLINIC | Age: 62
End: 2022-02-02

## 2022-02-02 NOTE — TELEPHONE ENCOUNTER
Caller: THI    Relationship to patient: CARDIOLOGY  DR NIKKO MARTE    Best call back number:      Patient is needing: DR MARTE RECEIVED THE REQUEST FOR CARDIAC CLEARANCE TO HOLD PLAVIX FOR 7 DAYS BUT DR MARTE WOULD LIKE THE HOLD TO ONLY BE 5 DAYS

## 2022-02-03 ENCOUNTER — TELEPHONE (OUTPATIENT)
Dept: PAIN MEDICINE | Facility: CLINIC | Age: 62
End: 2022-02-03

## 2022-02-03 NOTE — TELEPHONE ENCOUNTER
Spoke with Renae at Dr. Yang's office and she gave the verbal for pt to stop her Plavix for 7 days prior to her procedure.  I called and advised that the pt of that, and she understood. She also advised that she has been having an issue with her compound cream and the tens unit with physical therapy. She stated that she has had red, raw, broken out, and tender skin on her neck. I advised to try and use the cream after physical therapy, or see If physical therapy can move the tens unit or give her suggestions on what to do. Pt understood, no further needs expressed. I advised the pt if she needs anything, it's easier to send us a [employer] message and we can reply faster.    MARTINA on CPAP

## 2022-02-03 NOTE — TELEPHONE ENCOUNTER
See other telephone encounter. Dr. Yang approved for pt to stop her Plavix 7 days prior her EMMY. Advised pt as well.

## 2022-02-04 DIAGNOSIS — M50.120 CERVICAL DISC DISORDER WITH RADICULOPATHY OF MID-CERVICAL REGION: Primary | ICD-10-CM

## 2022-02-05 NOTE — TELEPHONE ENCOUNTER
Rx Refill Note  Requested Prescriptions     Pending Prescriptions Disp Refills   • Dietary Management Product (Rheumate) capsule 60 capsule 1     Sig: Take 1 capsule by mouth 2 (Two) Times a Day.      Last office visit with prescribing clinician: 1/26/2022      Next office visit with prescribing clinician: Visit date not found            Bibi Santillan RN  02/04/22, 19:55 EST

## 2022-02-07 DIAGNOSIS — M50.120 CERVICAL DISC DISORDER WITH RADICULOPATHY OF MID-CERVICAL REGION: ICD-10-CM

## 2022-02-07 RX ORDER — ME-TETRAHYDROFOLATE/B12/HRB236 1-1-500 MG
1 CAPSULE ORAL 2 TIMES DAILY
Qty: 60 CAPSULE | Refills: 1 | Status: SHIPPED | OUTPATIENT
Start: 2022-02-07 | End: 2022-02-07 | Stop reason: SDUPTHER

## 2022-02-07 RX ORDER — ME-TETRAHYDROFOLATE/B12/HRB236 1-1-500 MG
1 CAPSULE ORAL 2 TIMES DAILY
Qty: 60 CAPSULE | Refills: 4 | Status: SHIPPED | OUTPATIENT
Start: 2022-02-07 | End: 2022-08-31

## 2022-02-21 ENCOUNTER — OUTSIDE FACILITY SERVICE (OUTPATIENT)
Dept: PAIN MEDICINE | Facility: CLINIC | Age: 62
End: 2022-02-21

## 2022-02-23 ENCOUNTER — PRIOR AUTHORIZATION (OUTPATIENT)
Dept: ENDOCRINOLOGY | Facility: CLINIC | Age: 62
End: 2022-02-23

## 2022-02-23 NOTE — TELEPHONE ENCOUNTER
Message from plan: This request cannot be processed due to the medication is not covered by the plan.

## 2022-02-24 ENCOUNTER — LAB (OUTPATIENT)
Dept: LAB | Facility: HOSPITAL | Age: 62
End: 2022-02-24

## 2022-02-24 DIAGNOSIS — E03.9 ACQUIRED HYPOTHYROIDISM: ICD-10-CM

## 2022-02-24 PROCEDURE — 84439 ASSAY OF FREE THYROXINE: CPT

## 2022-02-24 PROCEDURE — 84443 ASSAY THYROID STIM HORMONE: CPT

## 2022-02-24 PROCEDURE — 84480 ASSAY TRIIODOTHYRONINE (T3): CPT

## 2022-02-25 ENCOUNTER — LAB (OUTPATIENT)
Dept: LAB | Facility: HOSPITAL | Age: 62
End: 2022-02-25

## 2022-02-25 ENCOUNTER — PATIENT MESSAGE (OUTPATIENT)
Dept: ENDOCRINOLOGY | Facility: CLINIC | Age: 62
End: 2022-02-25

## 2022-02-25 DIAGNOSIS — R63.5 WEIGHT GAIN: Primary | ICD-10-CM

## 2022-02-25 DIAGNOSIS — R63.5 WEIGHT GAIN: ICD-10-CM

## 2022-02-25 LAB
T3 SERPL-MCNC: 107 NG/DL (ref 80–200)
T4 FREE SERPL-MCNC: 1.46 NG/DL (ref 0.93–1.7)
TSH SERPL DL<=0.05 MIU/L-ACNC: 5.73 UIU/ML (ref 0.27–4.2)

## 2022-02-25 PROCEDURE — 82533 TOTAL CORTISOL: CPT

## 2022-02-25 RX ORDER — LIOTHYRONINE SODIUM 5 UG/1
10 TABLET ORAL DAILY
Qty: 60 TABLET | Refills: 3 | Status: SHIPPED | OUTPATIENT
Start: 2022-02-25 | End: 2022-08-31

## 2022-03-01 ENCOUNTER — OFFICE VISIT (OUTPATIENT)
Dept: INTERNAL MEDICINE | Facility: CLINIC | Age: 62
End: 2022-03-01

## 2022-03-01 VITALS
SYSTOLIC BLOOD PRESSURE: 124 MMHG | HEIGHT: 63 IN | WEIGHT: 132 LBS | BODY MASS INDEX: 23.39 KG/M2 | HEART RATE: 67 BPM | OXYGEN SATURATION: 96 % | TEMPERATURE: 96.9 F | DIASTOLIC BLOOD PRESSURE: 82 MMHG

## 2022-03-01 DIAGNOSIS — R23.4 BREAST SKIN CHANGES: Primary | ICD-10-CM

## 2022-03-01 PROCEDURE — 99213 OFFICE O/P EST LOW 20 MIN: CPT | Performed by: STUDENT IN AN ORGANIZED HEALTH CARE EDUCATION/TRAINING PROGRAM

## 2022-03-01 NOTE — PROGRESS NOTES
"              Internal Medicine Acute Visit     Patient Name: Radha White  : 1960   MRN: [id number]     Chief Complaint:    Chief Complaint   Patient presents with   • Follow-up     Brown spots on right breast       History of Present Illness: Radha White is a 61 y.o. female who presents for skin changes on right breast. She reports that there are two purple/brownish spots on her right breast. The one closer to the nipple popped up first then a day later the other. She does not remember any specific trauma but notes that she does bruise easily. She has regular mammograms, with last being in 2021 and [facility] which was reportedly normal. She has not noticed in lumps, bumps, or nipple discharge.     Subjective     I have reviewed and the following portions of the patient's history were updated as appropriate: past family history, past medical history, past social history, past surgical history and problem list.    Allergies:   Allergies   Allergen Reactions   • Nsaids Unknown - High Severity       Objective     Physical Exam:  Vital Signs:   Vitals:    22 1018   BP: 124/82   Pulse: 67   Temp: 96.9 °F (36.1 °C)   SpO2: 96%   Weight: 59.9 kg (132 lb)   Height: 160 cm (63\")   PainSc: 0-No pain     Body mass index is 23.38 kg/m².    Physical Exam  Vitals and nursing note reviewed.   Constitutional:       General: She is not in acute distress.     Appearance: Normal appearance. She is not ill-appearing or toxic-appearing.   Cardiovascular:      Rate and Rhythm: Normal rate.   Pulmonary:      Effort: Pulmonary effort is normal. No respiratory distress.   Chest:   Breasts: Breasts are symmetrical.      Right: Skin change (two ~ 1 cm bruises noted around 10-11 o'clock.) present. No swelling, bleeding, inverted nipple, mass, nipple discharge or tenderness.       Skin:     General: Skin is warm and dry.   Neurological:      Mental Status: She is alert.   Psychiatric:         Mood and " Affect: Mood normal.         Behavior: Behavior normal.       Assessment / Plan      Assessment/Plan:   Diagnoses and all orders for this visit:    1. Breast skin changes (Primary)  Appear to be benign bruises without associated mass. She notes that these have popped up around the same area before but does not note previous trauma. She has regular mammograms, with last being normal in 07/2021. Favor this being bruising from low impact trauma that she didn't recognize. As this occurs in the same place each time, it may be something like a seat belt or something similar. No further evaluation at this time but encouraged her to keep her follow up appointment in several weeks to re-evaluate.     Follow Up:   Return for Next scheduled follow up.    Time:   I spent approximately 15 minutes providing clinical care for this patient; including review of patient's chart and provider documentation, face to face time spent with patient in examination room (obtaining history, performing physical exam, discussing diagnosis and management options), placing orders, and completing patient documentation.     Angela Collado MD  [facility]

## 2022-03-05 LAB
CORTIS BS SAL-MCNC: 0.42 UG/DL
CORTIS SP1 P CHAL SAL-MCNC: 0.57 UG/DL

## 2022-03-07 ENCOUNTER — PATIENT MESSAGE (OUTPATIENT)
Dept: ENDOCRINOLOGY | Facility: CLINIC | Age: 62
End: 2022-03-07

## 2022-03-07 NOTE — TELEPHONE ENCOUNTER
From: Radha White  To: Urbano Helton PA-C  Sent: 3/7/2022 3:17 PM EST  Subject: Question regarding SALIVARY CORTISOL X2, TIMED    Good afternoon. I’m wondering what these results mean?

## 2022-03-08 DIAGNOSIS — R79.89 ELEVATED CORTISOL LEVEL: Primary | ICD-10-CM

## 2022-03-10 ENCOUNTER — LAB (OUTPATIENT)
Dept: LAB | Facility: HOSPITAL | Age: 62
End: 2022-03-10

## 2022-03-10 ENCOUNTER — LAB (OUTPATIENT)
Dept: ENDOCRINOLOGY | Facility: CLINIC | Age: 62
End: 2022-03-10

## 2022-03-10 DIAGNOSIS — R79.89 ELEVATED CORTISOL LEVEL: Primary | ICD-10-CM

## 2022-03-10 LAB
COLLECT DURATION TIME UR: 24 HRS
CREAT SERPL-MCNC: 0.88 MG/DL (ref 0.57–1)
CREAT UR-MCNC: 44.4 MG/DL
CREATINE 24H UR-MRATE: 1.15 G/24 HR (ref 0.7–1.6)
EGFRCR SERPLBLD CKD-EPI 2021: 75.3 ML/MIN/1.73
SPECIMEN VOL 24H UR: 2600 ML

## 2022-03-10 PROCEDURE — 81050 URINALYSIS VOLUME MEASURE: CPT

## 2022-03-10 PROCEDURE — 82565 ASSAY OF CREATININE: CPT

## 2022-03-10 PROCEDURE — 82530 CORTISOL FREE: CPT

## 2022-03-10 PROCEDURE — 36415 COLL VENOUS BLD VENIPUNCTURE: CPT

## 2022-03-10 PROCEDURE — 82570 ASSAY OF URINE CREATININE: CPT

## 2022-03-10 PROCEDURE — 82533 TOTAL CORTISOL: CPT

## 2022-03-18 LAB
CORTIS F 24H UR-MCNC: 6 UG/L
CORTIS F 24H UR-MRATE: 16 UG/24 HR (ref 6–42)

## 2022-03-20 LAB
CORTIS BS SAL-MCNC: 0.03 UG/DL
CORTIS SP1 P CHAL SAL-MCNC: 0.01 UG/DL

## 2022-03-22 ENCOUNTER — OFFICE VISIT (OUTPATIENT)
Dept: NEUROSURGERY | Facility: CLINIC | Age: 62
End: 2022-03-22

## 2022-03-22 VITALS
DIASTOLIC BLOOD PRESSURE: 76 MMHG | SYSTOLIC BLOOD PRESSURE: 120 MMHG | BODY MASS INDEX: 23 KG/M2 | WEIGHT: 129.8 LBS | TEMPERATURE: 97.9 F | HEIGHT: 63 IN

## 2022-03-22 DIAGNOSIS — M50.120 CERVICAL DISC DISORDER WITH RADICULOPATHY OF MID-CERVICAL REGION: Primary | ICD-10-CM

## 2022-03-22 PROCEDURE — 99204 OFFICE O/P NEW MOD 45 MIN: CPT | Performed by: PHYSICIAN ASSISTANT

## 2022-03-22 RX ORDER — LEVOTHYROXINE SODIUM 175 UG/1
1 CAPSULE ORAL DAILY
Qty: 90 CAPSULE | Refills: 0 | Status: SHIPPED | OUTPATIENT
Start: 2022-03-22 | End: 2022-08-31

## 2022-03-22 NOTE — PROGRESS NOTES
Patient: Radha White  : 1960  Gender: female    Primary Care Provider: Angela Collado MD    Requesting Provider:  Roselia Lynn, APRN  [address] [facility] [address]  [place] [id number]     Chief Complaint: Neck and right arm pain    History of Present Illness:  This is a pleasant 61-year-old woman who is seen today for evaluation of neck and right arm pain.  Patient states that in 2020 she suffered an injury after which she developed her symptoms.  Initially she had significant difficulty moving her right arm.  Since then her strength has improved, however she has continued to have severe pain in her posterior cervical region that radiates to her proximal right arm.  Occasionally she will have some pain that shoots to the hand.  She notes numbness of the hand that is particularly worse at night.  She feels that the whole hand may be affected, however the fourth and fifth digits may be the worst.  Occasionally she will have some proximal left arm pain.  She has been followed by Dr. Puri's and Roselia Lynn recently.  She has been through extensive physical therapy and cervical injections.  Additionally she has attempted several medications including anti-inflammatories and gabapentin/Lyrica to no avail.  At this point she is quite miserable with her right arm pain.  She presents today with several studies.    Patient's medical history significant for CAD (on aspirin and Plavix), hypertension, hyperlipidemia, thyroidism, history of nephrectomy.  She has history of remote lumbar intervention with Dr. De La Rosa.    Past Medical and Surgical History:  Past Medical History:   Diagnosis Date   • Acid reflux    • Arthritis    • Asthma    • Cervical disc disorder Summer 2021   • Coronary artery disease    • Gout    • Heart disease    • High cholesterol    • Hypertension    • Hypertension    • Hypothyroidism    • Peripheral neuropathy 2022     Past Surgical History:   Procedure Laterality  Date   • APPENDECTOMY     • BACK SURGERY     • CARDIAC SURGERY      2 stints   • CAROTID STENT  12/2018   • EPIDURAL BLOCK  9/87 and 4/89   • HYSTERECTOMY     • LIVER BIOPSY      x 2   • NEPHRECTOMY     • TRIGGER POINT INJECTION         Current Medications:    Current Outpatient Medications:   •  aspirin (aspirin) 81 MG EC tablet, Take 1 capsule twice a day by oral route for 30 days., Disp: , Rfl:   •  clopidogrel (PLAVIX) 75 MG tablet, clopidogrel 75 mg tablet, Disp: , Rfl:   •  Dietary Management Product (Rheumate) capsule, Take 1 capsule by mouth 2 (Two) Times a Day., Disp: 60 capsule, Rfl: 4  •  furosemide (LASIX) 40 MG tablet, furosemide 40 mg tablet, Disp: , Rfl:   •  Gel Base gel, lidocaine 10%, prilocaine 2%, mannitol 20%, capsaicin 0.001%, imipramine 3% cream, apply cream every 6-8 hours to affected areas as needed, Disp: 240 g, Rfl: 5  •  labetalol (NORMODYNE) 300 MG tablet, labetalol 300 mg tablet  Take 1 tablet every day by oral route., Disp: , Rfl:   •  liothyronine (CYTOMEL) 5 MCG tablet, Take 2 tablets by mouth Daily., Disp: 60 tablet, Rfl: 3  •  lisinopril (PRINIVIL,ZESTRIL) 10 MG tablet, lisinopril 10 mg tablet, Disp: , Rfl:   •  multivitamin with minerals tablet tablet, Take 1 tablet by mouth Daily., Disp: , Rfl:   •  potassium chloride (Klor-Con) 20 MEQ packet, Take 20 mEq by mouth As Needed., Disp: , Rfl:   •  Probiotic Product (Pro-biotic Blend) capsule, Take  by mouth., Disp: , Rfl:   •  propylene glycol liquid, , Disp: , Rfl:   •  rosuvastatin (CRESTOR) 40 MG tablet, , Disp: , Rfl:   •  Tirosint 175 MCG capsule, Take 1 capsule by mouth Daily., Disp: 90 capsule, Rfl: 0  •  vitamin B-6 (PYRIDOXINE) 100 MG tablet, Take 1 tablet by mouth Daily. Take for 30 days, Disp: 30 tablet, Rfl: 0    Allergies:  Allergies   Allergen Reactions   • Nsaids Unknown - High Severity     Liver failure          Review of Systems   Constitutional: Positive for unexpected weight loss.   Musculoskeletal: Positive for  "arthralgias, neck pain and neck stiffness.   Neurological: Positive for numbness and headache.         Physical Exam  Constitutional:       Appearance: Normal appearance.   Musculoskeletal:         General: Normal range of motion.      Cervical back: Normal range of motion and neck supple.   Skin:     General: Skin is warm and dry.   Neurological:      Mental Status: She is alert and oriented to person, place, and time.      Sensory: Sensation is intact.      Motor: Motor function is intact.      Coordination: Coordination is intact.      Gait: Gait is intact.      Deep Tendon Reflexes:      Reflex Scores:       Bicep reflexes are 1+ on the right side and 1+ on the left side.       Brachioradialis reflexes are 1+ on the right side and 1+ on the left side.       Patellar reflexes are 1+ on the right side and 1+ on the left side.       Achilles reflexes are 1+ on the right side and 1+ on the left side.     Comments: Negative Tinel's sign at the wrist and elbow bilaterally  Avinash sign negative bilaterally   Psychiatric:         Mood and Affect: Mood normal.         Behavior: Behavior normal.           Vitals:    03/22/22 0914   BP: 120/76   BP Location: Left arm   Patient Position: Sitting   Cuff Size: Adult   Temp: 97.9 °F (36.6 °C)   TempSrc: Infrared   Weight: 58.9 kg (129 lb 12.8 oz)   Height: 160 cm (63\")       Patient's Body mass index is 22.99 kg/m². indicating that she is within normal range (BMI 18.5-24.9). No BMI management plan needed..    Independent Review of Diagnostic Imaging:  MRI of the cervical spine performed 10/15/2021 demonstrates multilevel degenerative disc disease, significant at C4-5, C5-6, C6-7.  There is mild offset at C4-5.  There is no high-grade canal stenosis or abnormal cord signal.  Flexion-extension plain films  EMG/NCV performed 1/21/2022 demonstrates ulnar neuropathy at the elbow bilaterally, mild on the left, mild-moderate on the right.  Chronic right C6-7 " radiculopathy.    Assessment:  1.  Cervical radiculopathy  2.  Cervical degenerative disc disease  3.  Ulnar neuropathy    Plan:  This is a 61-year-old woman who is seen today for evaluation of neck and right upper extremity pain.  I have reviewed the studies with Dr. Hassan as detailed above.  Ultimately there are several issues at play here that could be the source of her neck and right arm pain.  She has electrodiagnostic confirmation of ulnar neuropathy and a C6-7 radiculopathy on the right in conjunction with her MRI findings.  We are going to proceed with a cervical myelogram for further evaluation.  Patient will follow up with Dr. Hassan after the myelogram.        Rosalva Cueva PA-C

## 2022-03-29 ENCOUNTER — PATIENT MESSAGE (OUTPATIENT)
Dept: NEUROSURGERY | Facility: CLINIC | Age: 62
End: 2022-03-29

## 2022-03-29 ENCOUNTER — HOSPITAL ENCOUNTER (OUTPATIENT)
Facility: HOSPITAL | Age: 62
Setting detail: HOSPITAL OUTPATIENT SURGERY
End: 2022-03-29
Attending: RADIOLOGY | Admitting: RADIOLOGY

## 2022-03-29 DIAGNOSIS — M50.120 CERVICAL DISC DISORDER WITH RADICULOPATHY OF MID-CERVICAL REGION: ICD-10-CM

## 2022-04-08 ENCOUNTER — TELEPHONE (OUTPATIENT)
Dept: NEUROSURGERY | Facility: CLINIC | Age: 62
End: 2022-04-08

## 2022-04-08 NOTE — TELEPHONE ENCOUNTER
PATIENT CALLED AND IS CANCELLING ALL APPTS WITH OUR OFFICE, SHE IS SEEKING CARE ELSEWHERE.  SHE HAS PROCEDURE SCHEDULE FOR 4/18/22 W/HEAVENLY AND FOLLOW UP W/MAY 4/18/22    PATIENT DID NOT STAY ON CALL LONG ENOUGH TO TRANSFER IF NEEDED.

## 2022-05-10 NOTE — TELEPHONE ENCOUNTER
----- Message from Radha White sent at 5/10/2022  5:35 PM EDT -----  Regarding: Refills  My file has been transferred to Tg Singh. I have 2 prescriptions that need to be refilled before our appointment.  They are Furosemide 40mg 1 per day and Clopidogrel 75mg 1 per day. Please call these refills to [facility].    Thank you!

## 2022-05-11 RX ORDER — CLOPIDOGREL BISULFATE 75 MG/1
75 TABLET ORAL DAILY
Qty: 30 TABLET | Refills: 4 | Status: SHIPPED | OUTPATIENT
Start: 2022-05-11

## 2022-05-11 RX ORDER — FUROSEMIDE 40 MG/1
40 TABLET ORAL DAILY
Qty: 30 TABLET | Refills: 4 | Status: SHIPPED | OUTPATIENT
Start: 2022-05-11 | End: 2022-08-31 | Stop reason: DRUGHIGH

## 2022-06-02 ENCOUNTER — TELEPHONE (OUTPATIENT)
Dept: INTERNAL MEDICINE | Facility: CLINIC | Age: 62
End: 2022-06-02

## 2022-06-02 NOTE — TELEPHONE ENCOUNTER
Caller: Radha White    Relationship: Self    Best call back number:[phone]  What orders are you requesting (i.e. lab or imaging): LABS    In what timeframe would the patient need to come in: ASAP    Where will you receive your lab/imaging services: IN OFFICE    Additional notes: PHYSICAL THERAPIST AND PAIN MANAGEMENT PROVIDER RECOMMENDED  PATIENT HAVE TESTING FOR MULTIPLE SCLEROSIS AND RHEUMATOID ARTHRITIS

## 2022-06-07 ENCOUNTER — TELEPHONE (OUTPATIENT)
Dept: INTERNAL MEDICINE | Facility: CLINIC | Age: 62
End: 2022-06-07

## 2022-06-07 NOTE — TELEPHONE ENCOUNTER
**HUB TO READ**    LVM FOR PATIENT.    DR. BUSBY WILL BE OUT THE REST OF THIS WEEK DUE TO ILLNESS.     NEEDED TO RESCHEDULE APPT.    RESCHEDULED FOR 6/17/2022 AT 3:45PM.    PLEASE RESCHEDULE IF NEEDED..    **QI'S LAST DAY IS fRIDAY, 06/17/2022

## 2022-08-31 ENCOUNTER — OFFICE VISIT (OUTPATIENT)
Dept: INTERNAL MEDICINE | Facility: CLINIC | Age: 62
End: 2022-08-31

## 2022-08-31 VITALS
WEIGHT: 128 LBS | DIASTOLIC BLOOD PRESSURE: 76 MMHG | TEMPERATURE: 97 F | OXYGEN SATURATION: 98 % | HEIGHT: 63 IN | BODY MASS INDEX: 22.68 KG/M2 | SYSTOLIC BLOOD PRESSURE: 136 MMHG | HEART RATE: 70 BPM

## 2022-08-31 DIAGNOSIS — Z98.61 CAD S/P PERCUTANEOUS CORONARY ANGIOPLASTY: ICD-10-CM

## 2022-08-31 DIAGNOSIS — M25.40 JOINT SWELLING: Primary | ICD-10-CM

## 2022-08-31 DIAGNOSIS — M79.18 MYOFASCIAL PAIN: ICD-10-CM

## 2022-08-31 DIAGNOSIS — R73.03 PREDIABETES: ICD-10-CM

## 2022-08-31 DIAGNOSIS — M50.120 CERVICAL DISC DISORDER WITH RADICULOPATHY OF MID-CERVICAL REGION: ICD-10-CM

## 2022-08-31 DIAGNOSIS — Z90.5 SINGLE KIDNEY: ICD-10-CM

## 2022-08-31 DIAGNOSIS — E03.9 ACQUIRED HYPOTHYROIDISM: ICD-10-CM

## 2022-08-31 DIAGNOSIS — I10 PRIMARY HYPERTENSION: ICD-10-CM

## 2022-08-31 DIAGNOSIS — E78.2 MIXED HYPERLIPIDEMIA: ICD-10-CM

## 2022-08-31 DIAGNOSIS — K76.9 LIVER DAMAGE: ICD-10-CM

## 2022-08-31 DIAGNOSIS — M25.50 ARTHRALGIA, UNSPECIFIED JOINT: ICD-10-CM

## 2022-08-31 DIAGNOSIS — I25.10 CAD S/P PERCUTANEOUS CORONARY ANGIOPLASTY: ICD-10-CM

## 2022-08-31 PROBLEM — Z79.01 CHRONIC ANTICOAGULATION: Status: RESOLVED | Noted: 2021-11-18 | Resolved: 2022-08-31

## 2022-08-31 PROCEDURE — 99214 OFFICE O/P EST MOD 30 MIN: CPT | Performed by: INTERNAL MEDICINE

## 2022-08-31 RX ORDER — SODIUM PHOSPHATE,MONO-DIBASIC 19G-7G/118
ENEMA (ML) RECTAL
COMMUNITY

## 2022-08-31 RX ORDER — UBIDECARENONE 100 MG
100 CAPSULE ORAL DAILY
COMMUNITY

## 2022-08-31 RX ORDER — MAGNESIUM GLUCONATE 27 MG(500)
27 TABLET ORAL 2 TIMES DAILY
COMMUNITY

## 2022-08-31 RX ORDER — LEVOTHYROXINE, LIOTHYRONINE 76; 18 UG/1; UG/1
TABLET ORAL
COMMUNITY
Start: 2022-08-29

## 2022-08-31 RX ORDER — LABETALOL 200 MG/1
200 TABLET, FILM COATED ORAL DAILY
COMMUNITY

## 2022-08-31 RX ORDER — FUROSEMIDE 40 MG/1
40 TABLET ORAL DAILY PRN
COMMUNITY

## 2022-08-31 RX ORDER — LEVOTHYROXINE, LIOTHYRONINE 19; 4.5 UG/1; UG/1
TABLET ORAL
COMMUNITY
Start: 2022-08-29

## 2022-08-31 RX ORDER — TURMERIC ROOT EXTRACT 500 MG
1000 TABLET ORAL
COMMUNITY

## 2022-09-01 NOTE — PROGRESS NOTES
Internal Medicine New Patient  Radha White is a 62 y.o. female who presents today to establish care and with concerns as outlined below.    Chief Complaint  Chief Complaint   Patient presents with   • Neck Injury     Follow up   • Establish Care     Off board from [facility]   • Joint Swelling     Hands, elbows, knees and ankles. Wonders if it coincides from injury earlier this year.        HPI  Ms. White comes in today to transition care.    She notes intermittent episodes of swelling from knees to feet and from elbows to hands. She has recently had one of these episodes and is treating with epsom salt soaks. She has seen improvement. Hands are still swollen and she cannot make a full fist. She does not have rash, dysphagia, SOA. She wonders about possibility of fibromyalgia. She has had JACE positive in the past.    She has had chronic neck pain secondary to prior injury. She was seeing neurosurgery but not interested in surgery so began to see [facility] and has been receiving ozone therapy.    She has hypothyroidism and had been following with [facility] however she notes that under her care she did not feel that her thyroid medications were effective. She has transitioned this also to provider at [facility] and is now on NP thyroid alternating 150mg and 180mg doses.    She has HTN on lisinopril 10mg daily and labetalol 300mg daily. She is also on labetalol due to history of CAD s/p PCI. She is on plavix and crestor as well. Her cardiologist is Dr. Yang at [facility].    She is s/p nephrectomy due to L kidney being cystic and nonfunctioning. She has had renal US showing R kidney hypertrophied but otherwise normal. She has had intermittently reduced renal function and proteinuria. She has been to nephrology.     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 She has been to  hepatology but does not keep routine follow up there.       Review of Systems  Review  of Systems   Constitutional: Negative.    HENT: Negative for trouble swallowing.    Respiratory: Negative.    Cardiovascular: Positive for leg swelling. Negative for chest pain and palpitations.   Gastrointestinal: Negative.    Genitourinary: Negative.    Musculoskeletal: Positive for joint swelling.   Skin: Negative.    Neurological: Negative.         Past Medical History  Past Medical History:   Diagnosis Date   • Acid reflux    • Arthritis    • Asthma    • Cervical disc disorder Summer 2021   • Coronary artery disease 12/218   • Gout    • Heart disease    • High cholesterol    • Hypertension    • Hypertension    • Hypothyroidism    • Peripheral neuropathy 1/2022        Surgical History  Past Surgical History:   Procedure Laterality Date   • APPENDECTOMY     • BACK SURGERY      L4-5 discectomy   • CARDIAC CATHETERIZATION      left, with 2 stents   • DIAGNOSTIC LAPAROSCOPY      ectopic pregnancy   • EPIDURAL BLOCK  9/87 and 4/89   • LAPAROSCOPIC TOTAL HYSTERECTOMY     • LIVER BIOPSY      x 2   • NEPHRECTOMY Right     cystic, nonfunctioning   • TRIGGER POINT INJECTION      neck        Family History  Family History   Problem Relation Age of Onset   • Arthritis Mother    • Kidney disease Mother         CKD4   • Alzheimer's disease Mother    • Heart murmur Mother    • Diabetes Father    • Heart attack Father 37        second MI in late 40s   • Hypertension Father    • Hypertension Brother    • Heart disease Brother    • Arthritis Maternal Grandmother    • Heart disease Maternal Grandmother    • No Known Problems Maternal Grandfather    • Heart disease Paternal Grandmother    • Stroke Paternal Grandmother    • Heart disease Paternal Grandfather    • Hypertension Son    • Hearing loss Paternal Aunt    • Thyroid disease Paternal Aunt    • Thyroid disease Daughter    • COPD Maternal Aunt    • Atrial fibrillation Maternal Aunt         Social History  Social History     Socioeconomic History   • Marital status:     Tobacco Use   • Smoking status: Never Smoker   • Smokeless tobacco: Never Used   • Tobacco comment: some in high school    Vaping Use   • Vaping Use: Never used   Substance and Sexual Activity   • Alcohol use: Yes     Alcohol/week: 2.0 standard drinks     Types: 2 Glasses of wine per week     Comment: occasional, social   • Drug use: Never   • Sexual activity: Yes     Partners: Male     Birth control/protection: Post-menopausal     Comment: Complete hysterectomy 1997        Current Medications  Current Outpatient Medications on File Prior to Visit   Medication Sig Dispense Refill   • aspirin 81 MG EC tablet Take 1 capsule twice a day by oral route for 30 days.     • clopidogrel (PLAVIX) 75 MG tablet Take 1 tablet by mouth Daily. 30 tablet 4   • coenzyme Q10 100 MG capsule Take 100 mg by mouth Daily.     • furosemide (LASIX) 40 MG tablet Take 40 mg by mouth Daily As Needed.     • Gel Base gel lidocaine 10%, prilocaine 2%, mannitol 20%, capsaicin 0.001%, imipramine 3% cream, apply cream every 6-8 hours to affected areas as needed     • glucosamine-chondroitin 500-400 MG capsule capsule Take  by mouth 3 (Three) Times a Day With Meals.     • labetalol (NORMODYNE) 200 MG tablet Take 200 mg by mouth Daily.     • magnesium gluconate (MAGONATE) 500 MG tablet Take 27 mg by mouth 2 (Two) Times a Day.     • metFORMIN (GLUCOPHAGE) 1000 MG tablet TAKE 1 TABLET BY MOUTH ONCE DAILY WITH A MEAL     • multivitamin with minerals tablet tablet Take 1 tablet by mouth Daily.     • NP Thyroid 120 MG tablet      • NP Thyroid 30 MG tablet      • Omega-3 Fatty Acids (Fish Oil) 1360 MG capsule Take  by mouth.     • potassium chloride (KLOR-CON) 20 MEQ packet Take 20 mEq by mouth As Needed.     • Probiotic Product (Pro-biotic Blend) capsule Take  by mouth.     • rosuvastatin (CRESTOR) 40 MG tablet      • Turmeric 500 MG tablet Take 1,000 mg by mouth.     • vitamin B-6 (PYRIDOXINE) 100 MG tablet Take 1 tablet by mouth Daily. Take for 30 days  "30 tablet 0     No current facility-administered medications on file prior to visit.       Allergies  Allergies   Allergen Reactions   • Nsaids Unknown - High Severity     Liver failure         Objective  Visit Vitals  /76   Pulse 70   Temp 97 °F (36.1 °C)   Ht 160 cm (63\")   Wt 58.1 kg (128 lb)   SpO2 98%   BMI 22.67 kg/m²        Physical Exam  Physical Exam  Vitals and nursing note reviewed.   Constitutional:       General: She is not in acute distress.     Appearance: Normal appearance. She is well-developed and normal weight. She is not ill-appearing or toxic-appearing.   HENT:      Head: Normocephalic and atraumatic.   Eyes:      Conjunctiva/sclera: Conjunctivae normal.   Cardiovascular:      Rate and Rhythm: Normal rate and regular rhythm.      Heart sounds: Normal heart sounds. No murmur heard.  Pulmonary:      Effort: Pulmonary effort is normal. No respiratory distress.      Breath sounds: Normal breath sounds.   Musculoskeletal:         General: Swelling (hands with mild diffuse edema and slight palmar erythema) present. No deformity.      Right lower leg: No edema.      Left lower leg: No edema.   Skin:     General: Skin is warm and dry.   Neurological:      General: No focal deficit present.      Mental Status: She is alert and oriented to person, place, and time.      Gait: Gait normal.          Results  Results for orders placed or performed in visit on 03/10/22   Salivary Cortisol X2, Timed    Specimen: Oral Cavity; Saliva   Result Value Ref Range    Cortisol, Salivary 0.025 ug/dL    Cortisol, Salivary #2 0.015 ug/dL   Cortisol, Urine, Free 24Hr - Urine, Clean Catch    Specimen: Urine, Clean Catch; 24 Hour Urine   Result Value Ref Range    Cortisol, Free 6 Undefined ug/L    Cortisol, 24H Ur 16 6 - 42 ug/24 hr   Creatinine, Urine, 24 Hour - Urine, Clean Catch    Specimen: Urine, Clean Catch; 24 Hour Urine   Result Value Ref Range    Creatinine, 24H 1.15 0.70 - 1.60 g/24 hr    Creatinine, Urine " 44.4 mg/dL    24H Urine Volume 2,600 mL    Time (Hours) 24 hrs   Creatinine, Serum    Specimen: Blood   Result Value Ref Range    Creatinine 0.88 0.57 - 1.00 mg/dL    eGFR 75.3 >60.0 mL/min/1.73        Assessment and Plan  Diagnoses and all orders for this visit:    Joint swelling and pain  - Has episodic swelling and pain in forearms, hands, lower legs and feet.  - Has had positive JACE in the past  - Will refer her to rheumatology    Myofascial pain  - Potential cause of her diffuse chronic pain, however will refer to rheumatology for evaluation as noted above.    Prediabetes  - She reports this was recently diagnosed by provider at [facility]. Now on metformin 1000mg daily.    CAD S/P percutaneous coronary angioplasty  - s/p PCI, follows with Dr. Yang  - asymptomatic  - on labetalol, crestor, plavix    Acquired hypothyroidism  - No longer following with endocrinology and now managed by provider at [facility]  - on NP thyroid 150mg and 180mg alternating    Mixed hyperlipidemia  - continue crestor 40mg daily    Cervical disc disorder with radiculopathy of mid-cervical region  - Defers surgery so no longer following with neurosurgery  - Receiving ozone therapy at [facility]  - Also has compounded pain relieving gel    Single kidney  - s/p L nephrectomy due to cystic, nonfunctioning kidney  - Has had intermittent renal dysfunction and proteinuria. Renal US with hypertrophied but otherwise unremarkable R kidney.  - Follows with nephrologist    Primary hypertension  - BP acceptable, continue labetalol 300mg daily and lisinopril 10mg daily    Liver damage  - reports acute liver failure due to NSAIDs in the past. No chronic liver disease.    Health Maintenance   Topic Date Due   • ANNUAL PHYSICAL  Never done   • Pneumococcal Vaccine 0-64 (1 - PCV) Never done   • ZOSTER VACCINE (1 of 2) Never done   • LIPID PANEL  Never done   • COVID-19 Vaccine (4 - Booster  for Pfizer series) 05/04/2022   • INFLUENZA VACCINE  10/01/2022   • MAMMOGRAM  07/07/2023   • COLORECTAL CANCER SCREENING  10/17/2028   • TDAP/TD VACCINES (4 - Td or Tdap) 04/07/2031   • HEPATITIS C SCREENING  Completed   • PAP SMEAR  Discontinued     Health Maintenance  - Pap smear: s/p hysterectomy  - Mammogram: 7/2021  - Colonoscopy: 10/2018  - HCV: negative  - Immunizations: discuss next visit  - Depression screening: screen next visit    Return in about 4 months (around 12/31/2022) for Annual.

## 2022-12-06 ENCOUNTER — TRANSCRIBE ORDERS (OUTPATIENT)
Dept: ADMINISTRATIVE | Facility: HOSPITAL | Age: 62
End: 2022-12-06

## 2022-12-06 DIAGNOSIS — M81.0 OSTEOPOROSIS, POSTMENOPAUSAL: Primary | ICD-10-CM

## 2023-01-17 ENCOUNTER — APPOINTMENT (OUTPATIENT)
Dept: BONE DENSITY | Facility: HOSPITAL | Age: 63
End: 2023-01-17

## 2025-03-16 NOTE — TELEPHONE ENCOUNTER
Has an appt with DR Singh on 08/31/22   Patient's COPD is controlled currently.   Continue scheduled inhalers.

## 2025-03-17 DIAGNOSIS — I10 ESSENTIAL HYPERTENSION: Primary | ICD-10-CM

## 2025-03-17 RX ORDER — VALSARTAN AND HYDROCHLOROTHIAZIDE 160; 25 MG/1; MG/1
1 TABLET ORAL DAILY
Qty: 45 TABLET | Refills: 0 | Status: SHIPPED | OUTPATIENT
Start: 2025-03-17

## 2025-03-17 NOTE — TELEPHONE ENCOUNTER
Rx Refill Note  Requested Prescriptions     Pending Prescriptions Disp Refills    valsartan-hydrochlorothiazide (Diovan HCT) 160-25 MG per tablet 30 tablet 0     Sig: Take 1 tablet by mouth Daily.      Last office visit with prescribing clinician: 3/27/24  Last telemedicine visit with prescribing clinician: RADHA  Next office visit with prescribing clinician: 5/5/2025                        Would you like a call back once the refill request has been completed: [] Yes [x] No [x] N/A    If the office needs to give you a call back, can they leave a voicemail: [] Yes [x] No [] N/A    Pharmacy Info    Last Fill Date: 9/27/24  Rx Written Date: NA  Prescribed Qty: 45  Additional Details from Pharmacy: PATIENT WILL NEED TO KEEP 5/5 APT FOR FURTHER REFILLS.      Livia Guerrero MA  03/17/25, 15:34 EDT

## 2025-05-06 NOTE — ASSESSMENT & PLAN NOTE
Needs BP log      Orders:    Comprehensive Metabolic Panel; Future    Microalbumin / Creatinine Urine Ratio - Urine, Clean Catch; Future    eGFR-Glomerular Filtration; Future

## 2025-05-06 NOTE — PROGRESS NOTES
"    Cardiology Established Patient Note     Name: Radha White  :   1960  PCP: Tg Singh MD  Date:   2025  Department: [facility] [place] [facility] CARDIOLOGY  [address]  [facility] [phone]  Fax [phone]  Phone [phone]    Chief Complaint: Here for my heart.     Problem list:  1.  Coronary artery disease native vessel   C 2018 stenting to LAD   SPECT 2023 some stress score of 9 sum difference score of 0 fixed moderate moderate apical inferior and septal regions, EF 59%  2.  Hypertension benign essential   2D echo 2023 EF 50 to 55%, mild TR, moderate aortic valve leaflet calcification   Renal artery duplex 2023 negative for renal artery stenosis, history of right nephrectomy   Abdominal aorta duplex 2023 negative for AAA  3.  Hyperlipidemia  4.  History of right nephrectomy    Subjective     History of Present Illness  Radha White is a 64 y.o. female who presents today for routine follow-up, patient has not been seen since 2024.    Overall she is doing very well.  She has no chest pain chest discomfort significant shortness of breath or palpitations no syncope or presyncope.  She is able to walk 3 miles daily without difficulty.  She has not had her labs done in a while.  EKG today shows normal sinus rhythm at 72 bpm with occasional PVC with inferior axis most likely source-RVOT.  She did not measure her blood pressure recently and is willing to do a blood pressure log.    eGFR 97 last   LDL58 last yr  Lab Results   Component Value Date    GLUCOSE 88 12/10/2021    BUN 18 12/10/2021    CREATININE 0.88 03/10/2022    EGFRIFNONA 62 12/10/2021    BCR 19.6 12/10/2021    K 4.1 12/10/2021    CO2 22.7 12/10/2021    CALCIUM 10.0 12/10/2021    ALBUMIN 4.2 12/10/2021    AST 58 (H) 10/25/2021    ALT 60 (H) 10/25/2021     No results found for: \"CHOL\", \"CHLPL\", \"TRIG\", \"HDL\", \"LDL\", \"LDLDIRECT\"   Lab Results " "  Component Value Date    WBC 7.93 10/25/2021    RBC 3.85 10/25/2021    HGB 12.5 10/25/2021    HCT 37.2 10/25/2021    MCV 96.6 10/25/2021     10/25/2021     Lab Results   Component Value Date    TSH 5.730 (H) 02/24/2022        Microalbumin, Urine   Date Value Ref Range Status   10/29/2021 4.6 mg/dL Final           Objective     Vital Signs:  There were no vitals taken for this visit.  Estimated body mass index is 22.67 kg/m² as calculated from the following:    Height as of 8/31/22: 160 cm (63\").    Weight as of 8/31/22: 58.1 kg (128 lb).         Cardiovascular:      PMI at left midclavicular line. Normal rate. Regular rhythm. Normal S1. Normal S2.       Murmurs: There is no murmur.      No gallop.  No click. No rub.   Pulses:     Intact distal pulses.   Edema:     Peripheral edema absent.       ECG 12 Lead    Date/Time: 5/7/2025 10:07 AM  Performed by: Heri Fallon MD    Authorized by: Heri Fallon MD  Previous ECG: no previous ECG available  Rhythm: sinus rhythm  Ectopy: unifocal PVCs  Rate: normal  Conduction: conduction normal  ST Segments: ST segments normal  QRS axis: normal  Comments: NSR PVCs inferior axis.  No previous EKG on file.         Assessment and Plan     Assessment & Plan  Coronary artery disease involving native coronary artery of native heart, unspecified whether angina present  Coronary Artery Disease (OPTIONAL): Coronary artery disease is stable.  Continue current treatment regimen.  Cardiac status will be reassessed in 6 months.         Primary hypertension  Needs BP log      Orders:  •  Comprehensive Metabolic Panel; Future  •  Microalbumin / Creatinine Urine Ratio - Urine, Clean Catch; Future  •  eGFR-Glomerular Filtration; Future    Hyperlipidemia LDL goal <55   Needs FLP.    Orders:  •  Hepatic Function Panel; Future  •  High Sensitivity CRP; Future  •  Lipoprotein A (LPA); Future  •  Lipid Panel; Future      Follow Up  No follow-ups on file.        [facility] " Cardiology

## 2025-05-07 ENCOUNTER — OFFICE VISIT (OUTPATIENT)
Age: 65
End: 2025-05-07
Payer: COMMERCIAL

## 2025-05-07 ENCOUNTER — LAB (OUTPATIENT)
Facility: HOSPITAL | Age: 65
End: 2025-05-07
Payer: COMMERCIAL

## 2025-05-07 VITALS
BODY MASS INDEX: 23.37 KG/M2 | SYSTOLIC BLOOD PRESSURE: 137 MMHG | HEIGHT: 63 IN | HEART RATE: 56 BPM | DIASTOLIC BLOOD PRESSURE: 83 MMHG | WEIGHT: 131.9 LBS

## 2025-05-07 DIAGNOSIS — E78.5 HYPERLIPIDEMIA LDL GOAL <55: ICD-10-CM

## 2025-05-07 DIAGNOSIS — I25.10 CORONARY ARTERY DISEASE INVOLVING NATIVE CORONARY ARTERY OF NATIVE HEART, UNSPECIFIED WHETHER ANGINA PRESENT: Primary | ICD-10-CM

## 2025-05-07 DIAGNOSIS — I10 PRIMARY HYPERTENSION: ICD-10-CM

## 2025-05-07 LAB — BILIRUB CONJ SERPL-MCNC: 0.3 MG/DL (ref 0–0.3)

## 2025-05-07 PROCEDURE — 36415 COLL VENOUS BLD VENIPUNCTURE: CPT

## 2025-05-07 PROCEDURE — 80053 COMPREHEN METABOLIC PANEL: CPT

## 2025-05-07 PROCEDURE — 86141 C-REACTIVE PROTEIN HS: CPT

## 2025-05-07 PROCEDURE — 80061 LIPID PANEL: CPT

## 2025-05-07 PROCEDURE — 83695 ASSAY OF LIPOPROTEIN(A): CPT

## 2025-05-07 PROCEDURE — 82043 UR ALBUMIN QUANTITATIVE: CPT

## 2025-05-07 PROCEDURE — 82248 BILIRUBIN DIRECT: CPT

## 2025-05-07 PROCEDURE — 82570 ASSAY OF URINE CREATININE: CPT

## 2025-05-07 PROCEDURE — 82565 ASSAY OF CREATININE: CPT

## 2025-05-07 RX ORDER — CHOLECALCIFEROL (VITAMIN D3) 1250 MCG
50000 CAPSULE ORAL
COMMUNITY
Start: 2025-04-11

## 2025-05-07 RX ORDER — EVOLOCUMAB 140 MG/ML
140 INJECTION, SOLUTION SUBCUTANEOUS
COMMUNITY
Start: 2025-03-04

## 2025-05-07 RX ORDER — DOXYCYCLINE 100 MG/1
100 CAPSULE ORAL 2 TIMES DAILY
COMMUNITY
Start: 2025-04-22

## 2025-05-07 RX ORDER — ALENDRONATE SODIUM 70 MG/1
70 TABLET ORAL
COMMUNITY
Start: 2025-03-12

## 2025-05-07 RX ORDER — OLOPATADINE HYDROCHLORIDE 2 MG/ML
1 SOLUTION/ DROPS OPHTHALMIC DAILY
COMMUNITY
Start: 2025-04-21

## 2025-05-07 RX ORDER — LABETALOL 300 MG/1
300 TABLET, FILM COATED ORAL DAILY
COMMUNITY
Start: 2025-03-14

## 2025-05-08 LAB
ALBUMIN SERPL-MCNC: 4.6 G/DL (ref 3.5–5.2)
ALBUMIN UR-MCNC: 1.4 MG/DL
ALBUMIN/GLOB SERPL: 1.4 G/DL
ALP SERPL-CCNC: 82 U/L (ref 39–117)
ALT SERPL W P-5'-P-CCNC: 39 U/L (ref 1–33)
ANION GAP SERPL CALCULATED.3IONS-SCNC: 16.6 MMOL/L (ref 5–15)
AST SERPL-CCNC: 77 U/L (ref 1–32)
BILIRUB SERPL-MCNC: 0.8 MG/DL (ref 0–1.2)
BUN SERPL-MCNC: 20 MG/DL (ref 8–23)
BUN/CREAT SERPL: 18 (ref 7–25)
CALCIUM SPEC-SCNC: 9.9 MG/DL (ref 8.6–10.5)
CHLORIDE SERPL-SCNC: 96 MMOL/L (ref 98–107)
CHOLEST SERPL-MCNC: 209 MG/DL (ref 0–200)
CO2 SERPL-SCNC: 23.4 MMOL/L (ref 22–29)
CREAT SERPL-MCNC: 1.11 MG/DL (ref 0.57–1)
CREAT UR-MCNC: 195.6 MG/DL
CRP SERPL-MCNC: 0.24 MG/DL (ref 0.01–0.5)
EGFRCR SERPLBLD CKD-EPI 2021: 56 ML/MIN/1.73
GLOBULIN UR ELPH-MCNC: 3.3 GM/DL
GLUCOSE SERPL-MCNC: 97 MG/DL (ref 65–99)
HDLC SERPL-MCNC: 56 MG/DL (ref 40–60)
LDLC SERPL CALC-MCNC: 107 MG/DL (ref 0–100)
LDLC/HDLC SERPL: 1.76 {RATIO}
MICROALBUMIN/CREAT UR: 7.2 MG/G (ref 0–29)
POTASSIUM SERPL-SCNC: 3.8 MMOL/L (ref 3.5–5.2)
PROT SERPL-MCNC: 7.9 G/DL (ref 6–8.5)
SODIUM SERPL-SCNC: 136 MMOL/L (ref 136–145)
TRIGL SERPL-MCNC: 272 MG/DL (ref 0–150)
VLDLC SERPL-MCNC: 46 MG/DL (ref 5–40)

## 2025-05-09 LAB
CREAT SERPL-MCNC: 1.17 MG/DL (ref 0.57–1)
EGFRCR SERPLBLD CKD-EPI 2021: 52 ML/MIN/1.73
LPA SERPL-SCNC: 21.7 NMOL/L

## 2025-05-12 ENCOUNTER — TELEPHONE (OUTPATIENT)
Age: 65
End: 2025-05-12
Payer: COMMERCIAL

## 2025-05-12 NOTE — TELEPHONE ENCOUNTER
Patient called stating that she is currently in [place] with low BP. Patient states that if they perform any procedures, she wants Dr. Fallon to do them. I advised the patient that we cannot make any changes to her care plan while in the hospital, under the care of another physician, unless Dr. Fallon is consulted by that provider. I also informed the patient that Dr. Fallon is out on vacation this week, but the hospital could call his partner, Dr. Camilo, if they had questions. Patient understood and states she will let them know and stated she just wanted to make us aware of what is going on.

## 2025-05-14 NOTE — PROGRESS NOTES
Cardiology Established Patient Note     Name: Radha White  :   1960  PCP: Jose Gaviria MD  Date:   2025  Department: [facility] [place] [facility] CARDIOLOGY  [address]  [facility] [address]  Fax [phone]  Phone [phone]    Chief Complaint: I passed out.       Problem list:  1.  Coronary artery disease native vessel              LHC 2018 stenting to LAD              SPECT 2023 some stress score of 9 sum difference score of 0 fixed moderate moderate apical inferior and septal regions, EF 59%  2.  Hypertension benign essential              2D echo 2023 EF 50 to 55%, mild TR, moderate aortic valve leaflet calcification              Renal artery duplex 2023 negative for renal artery stenosis, history of right nephrectomy              Abdominal aorta duplex 2023 negative for AAA  3.  Hyperlipidemia  4.  History of right nephrectomy    Subjective     History of Present Illness  Radha White is a 64 y.o. female who presents today for recent syncopal event.  6 days ago while eating salad at a restaurant had a sudden onset of severe dizziness, blurry vision, looked pale according to the family and had a sudden transient loss of consciousness for several minutes.  Taken to the [facility] had extensive workup including EKG revealing normal sinus rhythm with PVCs, normal 2D echocardiogram and normal carotid duplex.  Kept in the hospital for 4 days and discharged.  Blood pressure was very labile and some changes were made.  Patient reports normal blood pressure in the last 4 days.  Beta-blocker was changed to as below.  She has no palpitations and has no further syncopal events.  She was originally scheduled to be seen today for both hypertension and hyperlipidemia.     and we had gone overthe results below were discussed thoroughly with the patie the plan below.  In addition-LDL is not within goal on  "Repatha on 5/7/25  She has CKD 3a per recent labs, S/P R nephrectomy.      The following data was reviewed by: Heri Faloln MD on 05/16/2025:   Latest Reference Range & Units 05/07/25 10:27   Lipoprotein (a) <75.0 nmol/L 21.7   Sodium 136 - 145 mmol/L 136   Potassium 3.5 - 5.2 mmol/L 3.8   Chloride 98 - 107 mmol/L 96 (L)   CO2 22.0 - 29.0 mmol/L 23.4   Anion Gap 5.0 - 15.0 mmol/L 16.6 (H)   BUN 8 - 23 mg/dL 20   Creatinine 0.57 - 1.00 mg/dL  0.57 - 1.00 mg/dL 1.17 (H)  1.11 (H)   BUN/Creatinine Ratio 7.0 - 25.0  18.0   eGFR >60.0 mL/min/1.73 56.0 (L)   EGFR Result >59 mL/min/1.73 52 (L)   Glucose 65 - 99 mg/dL 97   Calcium 8.6 - 10.5 mg/dL 9.9   Alkaline Phosphatase 39 - 117 U/L 82   Total Protein 6.0 - 8.5 g/dL 7.9   Albumin 3.5 - 5.2 g/dL 4.6   Globulin gm/dL 3.3   A/G Ratio g/dL 1.4   AST (SGOT) 1 - 32 U/L 77 (H)   ALT (SGPT) 1 - 33 U/L 39 (H)   Total Bilirubin 0.0 - 1.2 mg/dL 0.8   Bilirubin, Direct 0.0 - 0.3 mg/dL 0.3   Total Cholesterol 0 - 200 mg/dL 209 (H)   HDL Cholesterol 40 - 60 mg/dL 56   LDL Cholesterol  0 - 100 mg/dL 107 (H)   VLDL Cholesterol 5 - 40 mg/dL 46 (H)   Triglycerides 0 - 150 mg/dL 272 (H)   LDL/HDL Ratio  1.76   Creatinine, Urine mg/dL 195.6   Microalbumin, Urine mg/dL 1.4   Microalbumin/Creatinine Ratio 0.0 - 29.0 mg/g 7.2   HS C-Reactive Protein 0.010 - 0.500 mg/dL 0.240   (L): Data is abnormally low  (H): Data is abnormally high                      Objective     Vital Signs:  /81 (BP Location: Left arm, Patient Position: Sitting, Cuff Size: Adult)   Pulse 65   Ht 160 cm (63\")   Wt 59.4 kg (130 lb 14.4 oz)   BMI 23.19 kg/m²   Estimated body mass index is 23.19 kg/m² as calculated from the following:    Height as of this encounter: 160 cm (63\").    Weight as of this encounter: 59.4 kg (130 lb 14.4 oz).         Neck:      Vascular: No carotid bruit or JVD.   Cardiovascular:      PMI at left midclavicular line. Normal rate. Regular rhythm. Normal S1. Normal S2.       Murmurs: " There is no murmur.      No gallop.  No click. No rub.   Edema:     Peripheral edema absent.   Abdominal:      General: There is no abdominal bruit.   Skin:     General: Skin is warm.   Feet:      Right foot:      Skin integrity: Skin integrity normal. No ulcer.      Left foot:      Skin integrity: No ulcer.             Assessment and Plan     Assessment & Plan  Syncope and collapse    Orders:    Holter Monitor - 72 Hour Up To 15 Days; Future    Hyperlipidemia LDL goal <100   Add    Orders:    Hepatic Function Panel; Future    High Sensitivity CRP; Future    Lipoprotein A (LPA); Future    Lipid Panel; Future    rosuvastatin (CRESTOR) 40 MG tablet; Take 1 tablet by mouth Daily.    Primary hypertension  Patient will bring blood pressure log for her next visit.         Stage 3a chronic kidney disease  Add Farxiga    Orders:    Comprehensive Metabolic Panel; Future    Microalbumin / Creatinine Urine Ratio - Urine, Clean Catch; Future    dapagliflozin Propanediol (Farxiga) 10 MG tablet; Take 10 mg by mouth 1 (One) Time Per Week.    Prediabetes  DC Metformin  Orders:    Hemoglobin A1c; Future      Follow Up  No follow-ups on file.    Heri Fallon MD    [facility]

## 2025-05-16 ENCOUNTER — OFFICE VISIT (OUTPATIENT)
Age: 65
End: 2025-05-16
Payer: COMMERCIAL

## 2025-05-16 ENCOUNTER — PATIENT ROUNDING (BHMG ONLY) (OUTPATIENT)
Age: 65
End: 2025-05-16

## 2025-05-16 VITALS
HEIGHT: 63 IN | DIASTOLIC BLOOD PRESSURE: 81 MMHG | BODY MASS INDEX: 23.2 KG/M2 | HEART RATE: 65 BPM | SYSTOLIC BLOOD PRESSURE: 132 MMHG | WEIGHT: 130.9 LBS

## 2025-05-16 DIAGNOSIS — R73.03 PREDIABETES: ICD-10-CM

## 2025-05-16 DIAGNOSIS — E78.5 HYPERLIPIDEMIA LDL GOAL <100: ICD-10-CM

## 2025-05-16 DIAGNOSIS — N18.31 STAGE 3A CHRONIC KIDNEY DISEASE: ICD-10-CM

## 2025-05-16 DIAGNOSIS — R55 SYNCOPE AND COLLAPSE: Primary | ICD-10-CM

## 2025-05-16 DIAGNOSIS — I10 PRIMARY HYPERTENSION: ICD-10-CM

## 2025-05-16 RX ORDER — MAGNESIUM OXIDE 400 MG/1
400 TABLET ORAL DAILY
COMMUNITY

## 2025-05-16 RX ORDER — ACEBUTOLOL HYDROCHLORIDE 200 MG/1
200 CAPSULE ORAL 2 TIMES DAILY
COMMUNITY

## 2025-05-16 RX ORDER — ROSUVASTATIN CALCIUM 40 MG/1
40 TABLET, COATED ORAL DAILY
Qty: 90 TABLET | Refills: 3 | Status: SHIPPED | OUTPATIENT
Start: 2025-05-16

## 2025-05-16 RX ORDER — DAPAGLIFLOZIN 10 MG/1
10 TABLET, FILM COATED ORAL WEEKLY
Qty: 12 TABLET | Refills: 1 | Status: SHIPPED | OUTPATIENT
Start: 2025-05-16

## 2025-05-16 NOTE — PROGRESS NOTES
My name is Mariya Valerio, and I am the Practice Manager for [facility] [place].    I would like to thank you for being a loyal patient. If you do not mind, I would like to ask you some questions about your recent visit with us. Please feel free to reply if you wish to provide us with feedback on your visit with our practice.    First, could you tell me what went well with your recent visit?    Secondly, we are always looking for ways to make our patients' experiences even better. Do you have any recommendations on what we can do to improve your experience?    Finally, overall were you satisfied with your visit with us as a [facility] facility?    Over the next few days, you will be receiving a Patient Experience Survey. Please consider taking the survey, as it helps [facility] in improving their patient care.    Thank you for taking the time to answer our questions today.    I hope you have a good day.

## 2025-05-28 ENCOUNTER — LAB (OUTPATIENT)
Facility: HOSPITAL | Age: 65
End: 2025-05-28
Payer: COMMERCIAL

## 2025-05-28 ENCOUNTER — TELEPHONE (OUTPATIENT)
Age: 65
End: 2025-05-28
Payer: COMMERCIAL

## 2025-05-28 DIAGNOSIS — E78.5 HYPERLIPIDEMIA LDL GOAL <100: ICD-10-CM

## 2025-05-28 DIAGNOSIS — R73.03 PREDIABETES: ICD-10-CM

## 2025-05-28 DIAGNOSIS — N18.31 STAGE 3A CHRONIC KIDNEY DISEASE: ICD-10-CM

## 2025-05-28 DIAGNOSIS — I49.3 PVC (PREMATURE VENTRICULAR CONTRACTION): Primary | ICD-10-CM

## 2025-05-28 LAB
BILIRUB CONJ SERPL-MCNC: 0.3 MG/DL (ref 0–0.3)
HBA1C MFR BLD: 5.2 % (ref 4.8–5.6)

## 2025-05-28 PROCEDURE — 80053 COMPREHEN METABOLIC PANEL: CPT

## 2025-05-28 PROCEDURE — 83695 ASSAY OF LIPOPROTEIN(A): CPT

## 2025-05-28 PROCEDURE — 82248 BILIRUBIN DIRECT: CPT

## 2025-05-28 PROCEDURE — 82570 ASSAY OF URINE CREATININE: CPT

## 2025-05-28 PROCEDURE — 83036 HEMOGLOBIN GLYCOSYLATED A1C: CPT

## 2025-05-28 PROCEDURE — 80061 LIPID PANEL: CPT

## 2025-05-28 PROCEDURE — 36415 COLL VENOUS BLD VENIPUNCTURE: CPT

## 2025-05-28 PROCEDURE — 82043 UR ALBUMIN QUANTITATIVE: CPT

## 2025-05-28 PROCEDURE — 86141 C-REACTIVE PROTEIN HS: CPT

## 2025-05-28 NOTE — TELEPHONE ENCOUNTER
Called patient and had discussion about monitor results, patient reports she rarely is feeling these episodes but has the last week or so started to notice them, Reports she noticed an episode last night.  Patient is already on beta-blocker and with her low heart rates on MCT not wanting to increase her at this time and with her history of syncopal events I do think it would be slater to have Dr. Hammond evaluate the patient.  I discussed with patient about a referral to electrophysiologist as her PVC burden is 13% on max tolerated beta-blocker, patient is agreeable.  Referral was placed

## 2025-05-28 NOTE — TELEPHONE ENCOUNTER
Patient called requesting results of her holter monitor that she recently wore. She said she would rather not have to wait to get the results.

## 2025-05-29 LAB
ALBUMIN SERPL-MCNC: 4.7 G/DL (ref 3.5–5.2)
ALBUMIN UR-MCNC: <1.2 MG/DL
ALBUMIN/GLOB SERPL: 1.4 G/DL
ALP SERPL-CCNC: 131 U/L (ref 39–117)
ALT SERPL W P-5'-P-CCNC: 86 U/L (ref 1–33)
ANION GAP SERPL CALCULATED.3IONS-SCNC: 13.9 MMOL/L (ref 5–15)
AST SERPL-CCNC: 108 U/L (ref 1–32)
BILIRUB SERPL-MCNC: 0.8 MG/DL (ref 0–1.2)
BUN SERPL-MCNC: 12 MG/DL (ref 8–23)
BUN/CREAT SERPL: 11.3 (ref 7–25)
CALCIUM SPEC-SCNC: 10.2 MG/DL (ref 8.6–10.5)
CHLORIDE SERPL-SCNC: 101 MMOL/L (ref 98–107)
CHOLEST SERPL-MCNC: 94 MG/DL (ref 0–200)
CO2 SERPL-SCNC: 24.1 MMOL/L (ref 22–29)
CREAT SERPL-MCNC: 1.06 MG/DL (ref 0.57–1)
CREAT UR-MCNC: 46.7 MG/DL
CRP SERPL-MCNC: 0.29 MG/DL (ref 0.01–0.5)
EGFRCR SERPLBLD CKD-EPI 2021: 59.1 ML/MIN/1.73
GLOBULIN UR ELPH-MCNC: 3.4 GM/DL
GLUCOSE SERPL-MCNC: 80 MG/DL (ref 65–99)
HDLC SERPL-MCNC: 38 MG/DL (ref 40–60)
LDLC SERPL CALC-MCNC: 33 MG/DL (ref 0–100)
LDLC/HDLC SERPL: 0.77 {RATIO}
MICROALBUMIN/CREAT UR: NORMAL MG/G{CREAT}
POTASSIUM SERPL-SCNC: 4.4 MMOL/L (ref 3.5–5.2)
PROT SERPL-MCNC: 8.1 G/DL (ref 6–8.5)
SODIUM SERPL-SCNC: 139 MMOL/L (ref 136–145)
TRIGL SERPL-MCNC: 133 MG/DL (ref 0–150)
VLDLC SERPL-MCNC: 23 MG/DL (ref 5–40)

## 2025-05-29 NOTE — PROGRESS NOTES
Cardiology Established Patient Note     Name: Radha White  :   1960  PCP: Jose Gaviria MD  Date:   2025  Department: [facility] CARDIOLOGY  [address]  [facility] [address]  Fax [phone]  Phone [phone]    Chief Complaint: Palpitations     Problem list:  1.  Coronary artery disease native vessel              LHC 2018 stenting to LAD              SPECT 2023 some stress score of 9 sum difference score of 0 fixed moderate moderate apical inferior and septal regions, EF 59%  2.  Hypertension benign essential              2D echo 2023 EF 50 to 55%, mild TR, moderate aortic valve leaflet calcification              Renal artery duplex 2023 negative for renal artery stenosis, history of right nephrectomy              Abdominal aorta duplex 2023 negative for AAA  3.  Hyperlipidemia  4.  History of right nephrectomy    Subjective     History of Present Illness  Radha White is a 64 y.o. female who presents today for follow up of Holter monitor results for recent syncopal event that was primarily thought to be vasovagal in nature.  Reports no further episodes.  Reports occasional palpitations which she had prior to her syncopal events  ? Increased frequency due to Acebutolol -that was initiated after her syncopal event.  Recent holter    Sinus rhythm.    Rare PACs.    Frequent unifocal PVCs with bigeminy.    PVC burden=13%.       The following data was reviewed by: Heri Fallon MD on 2025:    Lab Results   Component Value Date    GLUCOSE 80 2025    BUN 12.0 2025    CREATININE 1.06 (H) 2025    EGFRIFNONA 62 12/10/2021    BCR 11.3 2025    K 4.4 2025    CO2 24.1 2025    CALCIUM 10.2 2025    ALBUMIN 4.7 2025     (H) 2025    ALT 86 (H) 2025     Lab Results   Component Value Date    CHOL 94 2025    CHLPL 133 07/15/2023     "TRIG 133 05/28/2025    HDL 38 (L) 05/28/2025    LDL 33 05/28/2025      Lab Results   Component Value Date    WBC 6.9 07/15/2023    RBC 4.35 07/15/2023    HGB 14.1 07/15/2023    HCT 40.8 07/15/2023    MCV 93.8 07/15/2023     07/15/2023     Lab Results   Component Value Date    TSH 24.21 (H) 05/12/2025     Lab Results   Component Value Date    HGBA1C 5.20 05/28/2025     No results found for: \"LPACHOL\"   Microalbumin, Urine   Date Value Ref Range Status   05/28/2025 <1.2 mg/dL Final           Objective     Vital Signs:  /89 (BP Location: Left arm, Patient Position: Sitting)   Pulse 57   Ht 160 cm (63\")   Wt 58.9 kg (129 lb 12.8 oz)   BMI 22.99 kg/m²   Estimated body mass index is 22.99 kg/m² as calculated from the following:    Height as of this encounter: 160 cm (63\").    Weight as of this encounter: 58.9 kg (129 lb 12.8 oz).         Neck:      Vascular: No carotid bruit or JVD.   Cardiovascular:      PMI at left midclavicular line. Normal rate. Regular rhythm. Normal S1. Normal S2.       Murmurs: There is no murmur.      No gallop.  No click. No rub.   Edema:     Peripheral edema absent.   Abdominal:      General: There is no abdominal bruit.   Skin:     General: Skin is warm.   Feet:      Right foot:      Skin integrity: Skin integrity normal. No ulcer.      Left foot:      Skin integrity: No ulcer.             Assessment and Plan     Assessment & Plan  PVC (premature ventricular contraction)    Orders:    Treadmill Stress Test; Future    MRI Cardiac Function Complete With & Without Morphology; Future  Change Acebutolol to Zebeta  Coronary artery disease involving native coronary artery of native heart, unspecified whether angina present  Stable  Switch plavix to Xarelto    Orders:    Treadmill Stress Test; Future    MRI Cardiac Function Complete With & Without Morphology; Future    Rivaroxaban (XARELTO) 2.5 MG tablet; Take 1 tablet by mouth 2 (Two) Times a Day.    bisoprolol (ZEBeta) 10 MG tablet; " Take 1 tablet by mouth Daily.    Primary hypertension  BP log         Stage 3a chronic kidney disease  Renal condition is stable.  Continue current treatment regimen.  Renal condition will be reassessed in 3 months.         Hyperlipidemia LDL goal <55   Lipid abnormalities are stable    Plan:  Continue same medication/s without change.      Discussed medication dosage, use, side effects, and goals of treatment in detail.    Counseled patient on lifestyle modifications to help control hyperlipidemia.     Patient Treatment Goals:   LDL goal is less than 55    Followup in 6 months.    Orders:    Repatha SureClick solution auto-injector SureClick injection; Inject 1 mL under the skin into the appropriate area as directed Every 14 (Fourteen) Days.    Syncope and collapse    Orders:    Tilt Table; Future      Follow Up  No follow-ups on file.    Heri Fallon MD    [facility]

## 2025-05-30 ENCOUNTER — OFFICE VISIT (OUTPATIENT)
Age: 65
End: 2025-05-30
Payer: COMMERCIAL

## 2025-05-30 ENCOUNTER — PATIENT ROUNDING (BHMG ONLY) (OUTPATIENT)
Age: 65
End: 2025-05-30

## 2025-05-30 VITALS
BODY MASS INDEX: 23 KG/M2 | SYSTOLIC BLOOD PRESSURE: 134 MMHG | HEIGHT: 63 IN | WEIGHT: 129.8 LBS | HEART RATE: 57 BPM | DIASTOLIC BLOOD PRESSURE: 89 MMHG

## 2025-05-30 DIAGNOSIS — R55 SYNCOPE AND COLLAPSE: ICD-10-CM

## 2025-05-30 DIAGNOSIS — I49.3 PVC (PREMATURE VENTRICULAR CONTRACTION): Primary | ICD-10-CM

## 2025-05-30 DIAGNOSIS — I10 PRIMARY HYPERTENSION: ICD-10-CM

## 2025-05-30 DIAGNOSIS — I25.10 CORONARY ARTERY DISEASE INVOLVING NATIVE CORONARY ARTERY OF NATIVE HEART, UNSPECIFIED WHETHER ANGINA PRESENT: ICD-10-CM

## 2025-05-30 DIAGNOSIS — N18.31 STAGE 3A CHRONIC KIDNEY DISEASE: ICD-10-CM

## 2025-05-30 DIAGNOSIS — E78.5 HYPERLIPIDEMIA LDL GOAL <55: ICD-10-CM

## 2025-05-30 LAB — LPA SERPL-SCNC: 42.8 NMOL/L

## 2025-05-30 RX ORDER — EVOLOCUMAB 140 MG/ML
140 INJECTION, SOLUTION SUBCUTANEOUS
Qty: 6 ML | Refills: 1 | Status: SHIPPED | OUTPATIENT
Start: 2025-05-30

## 2025-05-30 RX ORDER — RIVAROXABAN 2.5 MG/1
2.5 TABLET, FILM COATED ORAL 2 TIMES DAILY
Qty: 180 TABLET | Refills: 1 | Status: SHIPPED | OUTPATIENT
Start: 2025-05-30

## 2025-05-30 RX ORDER — PREGABALIN 50 MG/1
50 CAPSULE ORAL 2 TIMES DAILY
COMMUNITY
Start: 2025-05-21

## 2025-05-30 RX ORDER — BISOPROLOL FUMARATE 10 MG/1
10 TABLET, FILM COATED ORAL DAILY
Qty: 30 TABLET | Refills: 1 | Status: SHIPPED | OUTPATIENT
Start: 2025-05-30

## 2025-05-30 NOTE — PROGRESS NOTES
My name is Mariya Valerio, and I am the Practice Manager for [facility] [place].    I would like to thank you for being a loyal patient. If you do not mind, I would like to ask you some questions about your recent visit with us. Please feel free to reply if you wish to provide us with feedback on your visit with our practice.    First, could you tell me what went well with your recent visit?    Secondly, we are always looking for ways to make our patients' experiences even better. Do you have any recommendations on what we can do to improve your experience?    Finally, overall were you satisfied with your visit with us as a [facility] facility?    Over the next few days, you will be receiving a Patient Experience Survey. Please consider taking the survey, as it helps [facility] in improving their patient care.    Thank you for taking the time to answer our questions today.    I hope you have a good day.

## 2025-06-05 RX ORDER — VALSARTAN AND HYDROCHLOROTHIAZIDE 160; 25 MG/1; MG/1
1 TABLET ORAL DAILY
Qty: 45 TABLET | Refills: 1 | Status: SHIPPED | OUTPATIENT
Start: 2025-06-05

## 2025-06-05 NOTE — TELEPHONE ENCOUNTER
Rx Refill Note  Requested Prescriptions     Pending Prescriptions Disp Refills    valsartan-hydrochlorothiazide (Diovan HCT) 160-25 MG per tablet 45 tablet 0     Sig: Take 1 tablet by mouth Daily. 1/2 TABLET BY MOUTH ONCE DAILY      Last office visit with prescribing clinician: 5/30/2025   Last telemedicine visit with prescribing clinician: Visit date not found   Next office visit with prescribing clinician: 7/24/2025                        Pharmacy Info    Last Fill Date:  Rx Written Date:   Prescribed Qty:   Additional Details from Pharmacy:    Patient passed protocols per mary ellen.         Priti Mcintosh MA  06/05/25, 07:57 EDT

## 2025-07-03 ENCOUNTER — TELEPHONE (OUTPATIENT)
Age: 65
End: 2025-07-03
Payer: COMMERCIAL

## 2025-07-03 RX ORDER — VALSARTAN AND HYDROCHLOROTHIAZIDE 80; 12.5 MG/1; MG/1
1 TABLET, FILM COATED ORAL DAILY
Qty: 90 TABLET | Refills: 1 | Status: SHIPPED | OUTPATIENT
Start: 2025-07-03

## 2025-07-03 NOTE — TELEPHONE ENCOUNTER
Roselia from [facility] called about the dosage on Valsartan/HCTZ. Script was sent in as one tab qd and 1/2 tab qd. Pharmacy is questioning the dosage and also tablet is not scored so if it is half tab can script be changed to 80/12.5 mg? Call back number is [phone]

## 2025-07-10 ENCOUNTER — HOSPITAL ENCOUNTER (OUTPATIENT)
Dept: MRI IMAGING | Facility: HOSPITAL | Age: 65
Discharge: HOME | End: 2025-07-10
Admitting: INTERNAL MEDICINE
Payer: COMMERCIAL

## 2025-07-10 ENCOUNTER — APPOINTMENT (OUTPATIENT)
Dept: CARDIOLOGY | Facility: HOSPITAL | Age: 65
End: 2025-07-10
Payer: COMMERCIAL

## 2025-07-10 DIAGNOSIS — I25.10 CORONARY ARTERY DISEASE INVOLVING NATIVE CORONARY ARTERY OF NATIVE HEART, UNSPECIFIED WHETHER ANGINA PRESENT: ICD-10-CM

## 2025-07-10 DIAGNOSIS — I49.3 PVC (PREMATURE VENTRICULAR CONTRACTION): ICD-10-CM

## 2025-07-10 PROCEDURE — 25510000002 GADOBENATE DIMEGLUMINE 529 MG/ML SOLUTION: Performed by: INTERNAL MEDICINE

## 2025-07-10 PROCEDURE — 75561 CARDIAC MRI FOR MORPH W/DYE: CPT

## 2025-07-10 PROCEDURE — A9577 INJ MULTIHANCE: HCPCS | Performed by: INTERNAL MEDICINE

## 2025-07-10 RX ADMIN — GADOBENATE DIMEGLUMINE 15 ML: 529 INJECTION, SOLUTION INTRAVENOUS at 14:53

## 2025-07-25 ENCOUNTER — TELEPHONE (OUTPATIENT)
Age: 65
End: 2025-07-25
Payer: COMMERCIAL

## 2025-07-25 NOTE — TELEPHONE ENCOUNTER
I received a rx change request from [facility] rx for the patient's diovan-hctz. When we refilled it on 6/5/25 Brenda cancelled it. Should the patient be on this medication? I don't see it on her med list but don't see where it was ever discontinued. If she is the rx is written for 1/2 tablet daily. Can that be taken that way?

## 2025-07-29 ENCOUNTER — HOSPITAL ENCOUNTER (OUTPATIENT)
Dept: CARDIOLOGY | Facility: HOSPITAL | Age: 65
Discharge: HOME | End: 2025-07-29
Payer: COMMERCIAL

## 2025-07-29 VITALS — HEIGHT: 63 IN | WEIGHT: 122 LBS | BODY MASS INDEX: 21.62 KG/M2

## 2025-07-29 DIAGNOSIS — I25.10 CORONARY ARTERY DISEASE INVOLVING NATIVE CORONARY ARTERY OF NATIVE HEART, UNSPECIFIED WHETHER ANGINA PRESENT: ICD-10-CM

## 2025-07-29 DIAGNOSIS — I49.3 PVC (PREMATURE VENTRICULAR CONTRACTION): ICD-10-CM

## 2025-07-29 RX ORDER — ACEBUTOLOL HYDROCHLORIDE 200 MG/1
200 CAPSULE ORAL 2 TIMES DAILY
COMMUNITY

## 2025-07-29 RX ORDER — CLOPIDOGREL BISULFATE 75 MG/1
75 TABLET ORAL DAILY
COMMUNITY

## 2025-07-30 NOTE — TELEPHONE ENCOUNTER
Patient is currently taking diovan-hcts 80-12.5mg once daily. Patient states that she does not need any refills right now. That she keeps being sent refills and has to much.

## 2025-07-31 ENCOUNTER — HOSPITAL ENCOUNTER (OUTPATIENT)
Dept: CARDIOLOGY | Facility: HOSPITAL | Age: 65
Discharge: HOME | End: 2025-07-31
Admitting: INTERNAL MEDICINE
Payer: COMMERCIAL

## 2025-07-31 LAB
BH CV STRESS BP STAGE 1: NORMAL
BH CV STRESS BP STAGE 2: NORMAL
BH CV STRESS DURATION MIN STAGE 1: 3
BH CV STRESS DURATION MIN STAGE 2: 3
BH CV STRESS DURATION MIN STAGE 3: 3
BH CV STRESS DURATION SEC STAGE 1: 0
BH CV STRESS DURATION SEC STAGE 2: 0
BH CV STRESS DURATION SEC STAGE 3: 0
BH CV STRESS GRADE STAGE 1: 10
BH CV STRESS GRADE STAGE 2: 12
BH CV STRESS GRADE STAGE 3: 14
BH CV STRESS HR STAGE 1: 93
BH CV STRESS HR STAGE 2: 118
BH CV STRESS HR STAGE 3: 125
BH CV STRESS METS STAGE 1: 5
BH CV STRESS METS STAGE 2: 7.5
BH CV STRESS METS STAGE 3: 10
BH CV STRESS O2 STAGE 1: 96
BH CV STRESS O2 STAGE 2: 96
BH CV STRESS O2 STAGE 3: 97
BH CV STRESS PROTOCOL 1: NORMAL
BH CV STRESS RECOVERY BP: NORMAL MMHG
BH CV STRESS RECOVERY HR: 67 BPM
BH CV STRESS RECOVERY O2: 99 %
BH CV STRESS SPEED STAGE 1: 1.7
BH CV STRESS SPEED STAGE 2: 2.5
BH CV STRESS SPEED STAGE 3: 3.4
BH CV STRESS STAGE 1: 1
BH CV STRESS STAGE 2: 2
BH CV STRESS STAGE 3: 3
MAXIMAL PREDICTED HEART RATE: 156 BPM
PERCENT MAX PREDICTED HR: 80.13 %
STRESS BASELINE BP: NORMAL MMHG
STRESS BASELINE HR: 61 BPM
STRESS O2 SAT REST: 96 %
STRESS PERCENT HR: 94 %
STRESS POST ESTIMATED WORKLOAD: 9.7 METS
STRESS POST EXERCISE DUR MIN: 7 MIN
STRESS POST EXERCISE DUR SEC: 47 SEC
STRESS POST O2 SAT PEAK: 97 %
STRESS POST PEAK BP: NORMAL MMHG
STRESS POST PEAK HR: 125 BPM
STRESS TARGET HR: 133 BPM

## 2025-07-31 PROCEDURE — 93017 CV STRESS TEST TRACING ONLY: CPT

## 2025-08-01 ENCOUNTER — OFFICE VISIT (OUTPATIENT)
Dept: ENDOCRINOLOGY | Facility: CLINIC | Age: 65
End: 2025-08-01
Payer: COMMERCIAL

## 2025-08-01 VITALS
OXYGEN SATURATION: 97 % | WEIGHT: 128.8 LBS | SYSTOLIC BLOOD PRESSURE: 152 MMHG | DIASTOLIC BLOOD PRESSURE: 80 MMHG | BODY MASS INDEX: 22.82 KG/M2 | HEART RATE: 68 BPM | HEIGHT: 63 IN

## 2025-08-01 DIAGNOSIS — E03.9 ACQUIRED HYPOTHYROIDISM: Primary | Chronic | ICD-10-CM

## 2025-08-01 LAB
T3FREE SERPL-MCNC: 4.39 PG/ML (ref 2–4.4)
T4 FREE SERPL-MCNC: 0.94 NG/DL (ref 0.92–1.68)
TSH SERPL DL<=0.05 MIU/L-ACNC: 14.2 UIU/ML (ref 0.27–4.2)

## 2025-08-01 PROCEDURE — 84439 ASSAY OF FREE THYROXINE: CPT | Performed by: INTERNAL MEDICINE

## 2025-08-01 PROCEDURE — 84481 FREE ASSAY (FT-3): CPT | Performed by: INTERNAL MEDICINE

## 2025-08-01 PROCEDURE — 84443 ASSAY THYROID STIM HORMONE: CPT | Performed by: INTERNAL MEDICINE

## 2025-08-01 NOTE — PROGRESS NOTES
Chief Complaint   Patient presents with    Hypothyroidism     Hypothyroidism, unspecified        New patient who is being seen in consultation regarding hypothyroidism at the request of Sandhya Lopez APRN HPI   Radha White is a 65 y.o. female who presents for evaluation of hypothyroidism.    Patient was previously seen by another provider in the office, last in January 2022.    Patient has been on multiple preparations of thyroid hormone historically.  She does report that she cannot take generic thyroid hormone due to variability in dosing on this preparation.  She has taken branded Synthroid, tirosint, Cytomel all previously.    Patient is currently taking NP thyroid 120 mg daily. She reports dose was changed aprppoxiamtely 4 months ago. She had previously been taking 240 mg daily.    Patient takes medication in the morning, concurrently with all other medications.  She stores medication in the bathroom, it does receive exposure to direct sunlight.    She reports feeling best around 115 pounds. She reports significant fluctuation in weight as well as hair loss, fatigue.    Past Medical History:   Diagnosis Date    Acid reflux     Arthritis     Asthma     Cervical disc disorder Summer 2021    Chronic pain disorder 7/2021    Coronary artery disease 12/218    Gout     Headache, tension-type 7/2021    Heart disease     High cholesterol     High cholesterol     Hypertension     Hypertension     Hypothyroidism     Joint pain 2013    Neck pain 7/2021    Peripheral neuropathy 1/2022    Tricuspid regurgitation     MILD BY ECHO     Past Surgical History:   Procedure Laterality Date    APPENDECTOMY      BACK SURGERY      L4-5 discectomy    CARDIAC CATHETERIZATION      left, with 2 stents    CARDIAC CATHETERIZATION  12/21/2018    RAFAEL MARTE/IOP:HTN/NORMAL RESTING LEFT HEART HEMODYNAMICS/NORMAL LEFT VENTRICULAR EJECTION FRACTION WALL MOTION/HEMODYNAMICALLY SIGNIFICANT 50% TO 60% ANGIOGRAPHIC PROXIMAL  LEFT ANTERIOR DESCENDING STENOSIS, STATUS POST SUCCESSFUL DRUG-ELUTING STENTING    CORONARY STENT PLACEMENT  12/2018    DIAGNOSTIC LAPAROSCOPY      ectopic pregnancy    EPIDURAL BLOCK  9/87 and 4/89    EYE SURGERY  2025    LAPAROSCOPIC TOTAL HYSTERECTOMY      LIVER BIOPSY      x 2    NEPHRECTOMY Right     cystic, nonfunctioning    TRIGGER POINT INJECTION      neck      Family History   Problem Relation Age of Onset    Arthritis Mother     Kidney disease Mother         CKD4    Alzheimer's disease Mother     Heart murmur Mother     Diabetes Father     Heart attack Father 37        second MI in late 40s    Hypertension Father     Heart disease Father     Hypertension Brother     Heart disease Brother     Arthritis Maternal Grandmother     Heart disease Maternal Grandmother     No Known Problems Maternal Grandfather     Heart disease Paternal Grandmother     Stroke Paternal Grandmother     Heart attack Paternal Grandmother     Heart disease Paternal Grandfather     Thyroid disease Daughter     Hypertension Son     COPD Maternal Aunt     Atrial fibrillation Maternal Aunt     Hearing loss Paternal Aunt     Thyroid disease Paternal Aunt     COPD Maternal Aunt     Hearing loss Paternal Aunt     Thyroid disease Paternal Aunt       Social History     Socioeconomic History    Marital status:    Tobacco Use    Smoking status: Never    Smokeless tobacco: Never    Tobacco comments:     some in high school    Vaping Use    Vaping status: Never Used   Substance and Sexual Activity    Alcohol use: Not Currently     Alcohol/week: 2.0 standard drinks of alcohol     Comment: occasional, social    Drug use: Never    Sexual activity: Yes     Partners: Male     Birth control/protection: Post-menopausal     Comment: Complete hysterectomy 1997      Allergies   Allergen Reactions    Nsaids Unknown - High Severity     Liver failure       Current Outpatient Medications on File Prior to Visit   Medication Sig Dispense Refill     "alendronate (FOSAMAX) 70 MG tablet Take 1 tablet by mouth Every 7 (Seven) Days.      aspirin 81 MG EC tablet Take 1 capsule twice a day by oral route for 30 days. (Patient taking differently: Take 1 tablet by mouth Daily.)      B Complex Vitamins (VITAMIN-B COMPLEX PO) Take  by mouth Daily.      Cholecalciferol (Vitamin D3) 1.25 MG (82107 UT) capsule Take 1 capsule by mouth Every 7 (Seven) Days.      clopidogrel (PLAVIX) 75 MG tablet Take 1 tablet by mouth Daily.      dapagliflozin Propanediol (Farxiga) 10 MG tablet Take 10 mg by mouth 1 (One) Time Per Week. 12 tablet 1    furosemide (LASIX) 40 MG tablet Take 1 tablet by mouth Daily As Needed.      magnesium oxide (MAG-OX) 400 MG tablet Take 1 tablet by mouth Daily.      NP Thyroid 120 MG tablet  (Patient taking differently: Take 1 tablet by mouth Daily.)      potassium chloride (KLOR-CON) 20 MEQ packet Take 20 mEq by mouth As Needed.      Probiotic Product (Pro-biotic Blend) capsule Take  by mouth.      Repatha SureClick solution auto-injector SureClick injection Inject 1 mL under the skin into the appropriate area as directed Every 14 (Fourteen) Days. 6 mL 1    rosuvastatin (CRESTOR) 40 MG tablet Take 1 tablet by mouth Daily. 90 tablet 3    valsartan-hydrochlorothiazide (Diovan HCT) 80-12.5 MG per tablet Take 1 tablet by mouth Daily. 90 tablet 1    acebutolol (SECTRAL) 200 MG capsule Take 1 capsule by mouth 2 (Two) Times a Day. (Patient not taking: Reported on 8/1/2025)       No current facility-administered medications on file prior to visit.        Review of Systems   Constitutional:  Positive for fatigue and unexpected weight gain.      +hair loss    Vitals:    08/01/25 1000   BP: 152/80   BP Location: Right arm   Patient Position: Sitting   Cuff Size: Adult   Pulse: 68   SpO2: 97%   Weight: 58.4 kg (128 lb 12.8 oz)   Height: 160 cm (62.99\")   Body mass index is 22.82 kg/m².     Physical Exam  Vitals reviewed.   Constitutional:       General: She is not in " acute distress.  Cardiovascular:      Rate and Rhythm: Normal rate and regular rhythm.   Pulmonary:      Effort: Pulmonary effort is normal.      Breath sounds: Normal breath sounds.   Neurological:      Mental Status: She is alert.   Psychiatric:         Mood and Affect: Mood and affect normal.         Behavior: Behavior is cooperative.          Labs/Imaging   Latest Reference Range & Units 09/28/21 11:55 10/29/21 13:52 01/24/22 16:54 02/24/22 12:22 05/11/25 13:44 05/12/25 12:06   TSH Baseline 0.350 - 4.940 uIU/mL 0.695 0.454 4.610 (H) 5.730 (H) 70.5 (H) (E) 24.21 (H) (E)   Free T4 0.70 - 1.48 ng/dL 1.45 1.57 1.43 1.46 0.63 (L) (E) 0.67 (L) (E)   T3, Total 80.0 - 200.0 ng/dl  75.3 (L) 103.0 107.0     (H): Data is abnormally high  (L): Data is abnormally low  (E): External lab result    Assessment and Plan    Diagnoses and all orders for this visit:    1. Acquired hypothyroidism (Primary)  -     TSH; Future  -     T4, Free; Future  -     T3, Free; Future  Patient is currently taking NP thyroid 120 mg daily.  Prior thyroid function testing reviewed.  Labs from May with significant variability in TSH on labs from 24 hours apart which is difficult to explain.  Discussed with patient that her current dose of NP thyroid is approximately double her weight-based equivalent.  I discussed concern for malabsorption given pattern of administration.  I discussed recommendation to take medication first thing in the morning on an empty stomach, separately, all other medications, at least 30 minutes to 1 hour prior to eating.  I discussed potential for degradation of thyroid hormone by exposure to direct sunlight/heat.  Patient will change medication storage.  Plan to update labs today and adjust medication as clinically indicated.  We discussed potential adverse effects of T3 containing therapy.  Patient is currently undergoing cardiac evaluation but denies any history of coronary artery disease or arrhythmia.  Determine next  steps after review of labs.         Return in about 3 months (around 11/1/2025) for Next scheduled follow up. The patient was instructed to contact the clinic with any interval questions or concerns.    Electronically signed by: Tg Williamson MD     Dictated Utilizing Dragon Dictation

## 2025-08-04 DIAGNOSIS — I25.10 CORONARY ARTERY DISEASE INVOLVING NATIVE CORONARY ARTERY OF NATIVE HEART, UNSPECIFIED WHETHER ANGINA PRESENT: ICD-10-CM

## 2025-08-04 RX ORDER — BISOPROLOL FUMARATE 10 MG/1
10 TABLET, FILM COATED ORAL DAILY
Qty: 90 TABLET | Refills: 1 | Status: SHIPPED | OUTPATIENT
Start: 2025-08-04

## 2025-08-06 ENCOUNTER — TELEPHONE (OUTPATIENT)
Age: 65
End: 2025-08-06
Payer: COMMERCIAL

## 2025-08-06 RX ORDER — ROSUVASTATIN CALCIUM 10 MG/1
10 TABLET, COATED ORAL DAILY
COMMUNITY
End: 2025-08-06 | Stop reason: SDUPTHER

## 2025-08-06 RX ORDER — ROSUVASTATIN CALCIUM 10 MG/1
10 TABLET, COATED ORAL DAILY
Qty: 90 TABLET | Refills: 1 | Status: SHIPPED | OUTPATIENT
Start: 2025-08-06

## 2025-08-25 ENCOUNTER — HOSPITAL ENCOUNTER (OUTPATIENT)
Facility: HOSPITAL | Age: 65
Discharge: HOME | End: 2025-08-25
Admitting: INTERNAL MEDICINE
Payer: COMMERCIAL

## 2025-08-25 DIAGNOSIS — R55 SYNCOPE AND COLLAPSE: ICD-10-CM

## 2025-08-25 PROCEDURE — 93660 TILT TABLE EVALUATION: CPT

## 2025-08-27 DIAGNOSIS — R07.9 CHEST PAIN, UNSPECIFIED TYPE: Primary | ICD-10-CM

## 2025-08-27 RX ORDER — RANOLAZINE 500 MG/1
500 TABLET, EXTENDED RELEASE ORAL 2 TIMES DAILY
Qty: 180 TABLET | Refills: 0 | Status: SHIPPED | OUTPATIENT
Start: 2025-08-27